# Patient Record
Sex: FEMALE | Race: WHITE | NOT HISPANIC OR LATINO | URBAN - METROPOLITAN AREA
[De-identification: names, ages, dates, MRNs, and addresses within clinical notes are randomized per-mention and may not be internally consistent; named-entity substitution may affect disease eponyms.]

---

## 2017-10-25 ENCOUNTER — HOSPITAL ENCOUNTER (INPATIENT)
Facility: MEDICAL CENTER | Age: 32
LOS: 21 days | End: 2017-11-15
Attending: OBSTETRICS & GYNECOLOGY | Admitting: OBSTETRICS & GYNECOLOGY
Payer: MEDICAID

## 2017-10-25 LAB
ABO GROUP BLD: NORMAL
ALBUMIN SERPL BCP-MCNC: 2.8 G/DL (ref 3.2–4.9)
ALBUMIN/GLOB SERPL: 0.8 G/DL
ALP SERPL-CCNC: 162 U/L (ref 30–99)
ALT SERPL-CCNC: 12 U/L (ref 2–50)
AMPHET UR QL SCN: NEGATIVE
ANION GAP SERPL CALC-SCNC: 9 MMOL/L (ref 0–11.9)
ANISOCYTOSIS BLD QL SMEAR: ABNORMAL
AST SERPL-CCNC: 33 U/L (ref 12–45)
BARBITURATES UR QL SCN: NEGATIVE
BASOPHILS # BLD AUTO: 0 % (ref 0–1.8)
BASOPHILS # BLD: 0 K/UL (ref 0–0.12)
BENZODIAZ UR QL SCN: NEGATIVE
BILIRUB SERPL-MCNC: 0.4 MG/DL (ref 0.1–1.5)
BLD GP AB SCN SERPL QL: NORMAL
BUN SERPL-MCNC: 15 MG/DL (ref 8–22)
BZE UR QL SCN: NEGATIVE
CALCIUM SERPL-MCNC: 8.7 MG/DL (ref 8.5–10.5)
CANNABINOIDS UR QL SCN: POSITIVE
CHLORIDE SERPL-SCNC: 106 MMOL/L (ref 96–112)
CO2 SERPL-SCNC: 18 MMOL/L (ref 20–33)
CREAT SERPL-MCNC: 0.7 MG/DL (ref 0.5–1.4)
CREAT UR-MCNC: 50.6 MG/DL
EOSINOPHIL # BLD AUTO: 0 K/UL (ref 0–0.51)
EOSINOPHIL NFR BLD: 0 % (ref 0–6.9)
ERYTHROCYTE [DISTWIDTH] IN BLOOD BY AUTOMATED COUNT: 47.3 FL (ref 35.9–50)
GFR SERPL CREATININE-BSD FRML MDRD: >60 ML/MIN/1.73 M 2
GLOBULIN SER CALC-MCNC: 3.6 G/DL (ref 1.9–3.5)
GLUCOSE SERPL-MCNC: 78 MG/DL (ref 65–99)
HCT VFR BLD AUTO: 29.8 % (ref 37–47)
HGB BLD-MCNC: 8.7 G/DL (ref 12–16)
HYPOCHROMIA BLD QL SMEAR: ABNORMAL
LYMPHOCYTES # BLD AUTO: 1.27 K/UL (ref 1–4.8)
LYMPHOCYTES NFR BLD: 8.8 % (ref 22–41)
MACROCYTES BLD QL SMEAR: ABNORMAL
MANUAL DIFF BLD: NORMAL
MCH RBC QN AUTO: 20.7 PG (ref 27–33)
MCHC RBC AUTO-ENTMCNC: 29.2 G/DL (ref 33.6–35)
MCV RBC AUTO: 70.8 FL (ref 81.4–97.8)
METAMYELOCYTES NFR BLD MANUAL: 1.7 %
METHADONE UR QL SCN: NEGATIVE
MICROCYTES BLD QL SMEAR: ABNORMAL
MONOCYTES # BLD AUTO: 0.13 K/UL (ref 0–0.85)
MONOCYTES NFR BLD AUTO: 0.9 % (ref 0–13.4)
MORPHOLOGY BLD-IMP: NORMAL
MYELOCYTES NFR BLD MANUAL: 0.9 %
NEUTROPHILS # BLD AUTO: 12.63 K/UL (ref 2–7.15)
NEUTROPHILS NFR BLD: 86.8 % (ref 44–72)
NEUTS BAND NFR BLD MANUAL: 0.9 % (ref 0–10)
NRBC # BLD AUTO: 0.25 K/UL
NRBC BLD AUTO-RTO: 1.7 /100 WBC
OPIATES UR QL SCN: NEGATIVE
OXYCODONE UR QL SCN: NEGATIVE
PCP UR QL SCN: NEGATIVE
PLATELET # BLD AUTO: 148 K/UL (ref 164–446)
PLATELET BLD QL SMEAR: NORMAL
PMV BLD AUTO: 10.7 FL (ref 9–12.9)
POLYCHROMASIA BLD QL SMEAR: NORMAL
POTASSIUM SERPL-SCNC: 3.9 MMOL/L (ref 3.6–5.5)
PROPOXYPH UR QL SCN: NEGATIVE
PROT SERPL-MCNC: 6.4 G/DL (ref 6–8.2)
PROT UR-MCNC: 423.3 MG/DL (ref 0–15)
PROT/CREAT UR: 8366 MG/G (ref 10–107)
RBC # BLD AUTO: 4.21 M/UL (ref 4.2–5.4)
RBC BLD AUTO: PRESENT
RH BLD: NORMAL
SODIUM SERPL-SCNC: 133 MMOL/L (ref 135–145)
URATE SERPL-MCNC: 6.3 MG/DL (ref 1.9–8.2)
WBC # BLD AUTO: 14.4 K/UL (ref 4.8–10.8)

## 2017-10-25 PROCEDURE — A9270 NON-COVERED ITEM OR SERVICE: HCPCS | Performed by: OBSTETRICS & GYNECOLOGY

## 2017-10-25 PROCEDURE — 700105 HCHG RX REV CODE 258

## 2017-10-25 PROCEDURE — 80053 COMPREHEN METABOLIC PANEL: CPT

## 2017-10-25 PROCEDURE — 82570 ASSAY OF URINE CREATININE: CPT

## 2017-10-25 PROCEDURE — 84550 ASSAY OF BLOOD/URIC ACID: CPT

## 2017-10-25 PROCEDURE — 86850 RBC ANTIBODY SCREEN: CPT

## 2017-10-25 PROCEDURE — 302790 HCHG STAT ANTEPARTUM CARE, DAILY

## 2017-10-25 PROCEDURE — 85007 BL SMEAR W/DIFF WBC COUNT: CPT

## 2017-10-25 PROCEDURE — 86901 BLOOD TYPING SEROLOGIC RH(D): CPT

## 2017-10-25 PROCEDURE — 86900 BLOOD TYPING SEROLOGIC ABO: CPT

## 2017-10-25 PROCEDURE — 36415 COLL VENOUS BLD VENIPUNCTURE: CPT

## 2017-10-25 PROCEDURE — 84156 ASSAY OF PROTEIN URINE: CPT

## 2017-10-25 PROCEDURE — 85027 COMPLETE CBC AUTOMATED: CPT

## 2017-10-25 PROCEDURE — 700102 HCHG RX REV CODE 250 W/ 637 OVERRIDE(OP): Performed by: OBSTETRICS & GYNECOLOGY

## 2017-10-25 PROCEDURE — 80307 DRUG TEST PRSMV CHEM ANLYZR: CPT

## 2017-10-25 PROCEDURE — 770002 HCHG ROOM/CARE - OB PRIVATE (112)

## 2017-10-25 RX ORDER — LABETALOL 100 MG/1
200 TABLET, FILM COATED ORAL ONCE
Status: COMPLETED | OUTPATIENT
Start: 2017-10-25 | End: 2017-10-25

## 2017-10-25 RX ORDER — BETAMETHASONE SODIUM PHOSPHATE AND BETAMETHASONE ACETATE 3; 3 MG/ML; MG/ML
12 INJECTION, SUSPENSION INTRA-ARTICULAR; INTRALESIONAL; INTRAMUSCULAR; SOFT TISSUE ONCE
Status: COMPLETED | OUTPATIENT
Start: 2017-10-26 | End: 2017-10-26

## 2017-10-25 RX ORDER — SODIUM CHLORIDE, SODIUM LACTATE, POTASSIUM CHLORIDE, CALCIUM CHLORIDE 600; 310; 30; 20 MG/100ML; MG/100ML; MG/100ML; MG/100ML
INJECTION, SOLUTION INTRAVENOUS
Status: COMPLETED
Start: 2017-10-25 | End: 2017-10-25

## 2017-10-25 RX ADMIN — SODIUM CHLORIDE, POTASSIUM CHLORIDE, SODIUM LACTATE AND CALCIUM CHLORIDE 1000 ML: 600; 310; 30; 20 INJECTION, SOLUTION INTRAVENOUS at 21:45

## 2017-10-25 RX ADMIN — LABETALOL HYDROCHLORIDE 200 MG: 100 TABLET, FILM COATED ORAL at 21:44

## 2017-10-25 ASSESSMENT — LIFESTYLE VARIABLES
DO YOU DRINK ALCOHOL: NO
EVER_SMOKED: YES
ALCOHOL_USE: NO

## 2017-10-25 ASSESSMENT — PAIN SCALES - GENERAL
PAINLEVEL_OUTOF10: 0
PAINLEVEL_OUTOF10: 0

## 2017-10-26 LAB
ABO GROUP BLD: NORMAL
ALBUMIN SERPL BCP-MCNC: 2.9 G/DL (ref 3.2–4.9)
ALBUMIN/GLOB SERPL: 0.9 G/DL
ALP SERPL-CCNC: 156 U/L (ref 30–99)
ALT SERPL-CCNC: 14 U/L (ref 2–50)
ANION GAP SERPL CALC-SCNC: 7 MMOL/L (ref 0–11.9)
ANISOCYTOSIS BLD QL SMEAR: ABNORMAL
AST SERPL-CCNC: 30 U/L (ref 12–45)
BASOPHILS # BLD AUTO: 0.4 % (ref 0–1.8)
BASOPHILS # BLD: 0.05 K/UL (ref 0–0.12)
BILIRUB SERPL-MCNC: 0.3 MG/DL (ref 0.1–1.5)
BUN SERPL-MCNC: 20 MG/DL (ref 8–22)
CALCIUM SERPL-MCNC: 8.4 MG/DL (ref 8.5–10.5)
CHLORIDE SERPL-SCNC: 106 MMOL/L (ref 96–112)
CO2 SERPL-SCNC: 19 MMOL/L (ref 20–33)
COMMENT 1642: NORMAL
CREAT SERPL-MCNC: 1.06 MG/DL (ref 0.5–1.4)
EOSINOPHIL # BLD AUTO: 0.01 K/UL (ref 0–0.51)
EOSINOPHIL NFR BLD: 0.1 % (ref 0–6.9)
ERYTHROCYTE [DISTWIDTH] IN BLOOD BY AUTOMATED COUNT: 48.2 FL (ref 35.9–50)
FERRITIN SERPL-MCNC: 18.7 NG/ML (ref 10–291)
GFR SERPL CREATININE-BSD FRML MDRD: 59 ML/MIN/1.73 M 2
GLOBULIN SER CALC-MCNC: 3.2 G/DL (ref 1.9–3.5)
GLUCOSE SERPL-MCNC: 143 MG/DL (ref 65–99)
HCT VFR BLD AUTO: 29.4 % (ref 37–47)
HGB BLD-MCNC: 8.4 G/DL (ref 12–16)
HYPOCHROMIA BLD QL SMEAR: ABNORMAL
IMM GRANULOCYTES # BLD AUTO: 0.71 K/UL (ref 0–0.11)
IMM GRANULOCYTES NFR BLD AUTO: 5.4 % (ref 0–0.9)
IRON SATN MFR SERPL: 5 % (ref 15–55)
IRON SERPL-MCNC: 24 UG/DL (ref 40–170)
LDH SERPL-CCNC: 283 U/L (ref 107–266)
LYMPHOCYTES # BLD AUTO: 1.54 K/UL (ref 1–4.8)
LYMPHOCYTES NFR BLD: 11.6 % (ref 22–41)
MACROCYTES BLD QL SMEAR: ABNORMAL
MCH RBC QN AUTO: 20.8 PG (ref 27–33)
MCHC RBC AUTO-ENTMCNC: 28.6 G/DL (ref 33.6–35)
MCV RBC AUTO: 72.8 FL (ref 81.4–97.8)
MICROCYTES BLD QL SMEAR: ABNORMAL
MONOCYTES # BLD AUTO: 0.1 K/UL (ref 0–0.85)
MONOCYTES NFR BLD AUTO: 0.8 % (ref 0–13.4)
MORPHOLOGY BLD-IMP: NORMAL
NEUTROPHILS # BLD AUTO: 10.86 K/UL (ref 2–7.15)
NEUTROPHILS NFR BLD: 81.7 % (ref 44–72)
NRBC # BLD AUTO: 0.29 K/UL
NRBC BLD AUTO-RTO: 2.2 /100 WBC
PLATELET # BLD AUTO: 159 K/UL (ref 164–446)
PLATELET BLD QL SMEAR: NORMAL
POIKILOCYTOSIS BLD QL SMEAR: NORMAL
POLYCHROMASIA BLD QL SMEAR: NORMAL
POTASSIUM SERPL-SCNC: 4.1 MMOL/L (ref 3.6–5.5)
PROT SERPL-MCNC: 6.1 G/DL (ref 6–8.2)
RBC # BLD AUTO: 4.04 M/UL (ref 4.2–5.4)
RBC BLD AUTO: PRESENT
SCHISTOCYTES BLD QL SMEAR: NORMAL
SODIUM SERPL-SCNC: 132 MMOL/L (ref 135–145)
TIBC SERPL-MCNC: 524 UG/DL (ref 250–450)
WBC # BLD AUTO: 13.3 K/UL (ref 4.8–10.8)

## 2017-10-26 PROCEDURE — 84156 ASSAY OF PROTEIN URINE: CPT

## 2017-10-26 PROCEDURE — 700111 HCHG RX REV CODE 636 W/ 250 OVERRIDE (IP): Performed by: OBSTETRICS & GYNECOLOGY

## 2017-10-26 PROCEDURE — 770002 HCHG ROOM/CARE - OB PRIVATE (112)

## 2017-10-26 PROCEDURE — 83550 IRON BINDING TEST: CPT

## 2017-10-26 PROCEDURE — 80053 COMPREHEN METABOLIC PANEL: CPT

## 2017-10-26 PROCEDURE — 82728 ASSAY OF FERRITIN: CPT

## 2017-10-26 PROCEDURE — 85025 COMPLETE CBC W/AUTO DIFF WBC: CPT

## 2017-10-26 PROCEDURE — 700102 HCHG RX REV CODE 250 W/ 637 OVERRIDE(OP): Performed by: OBSTETRICS & GYNECOLOGY

## 2017-10-26 PROCEDURE — 302790 HCHG STAT ANTEPARTUM CARE, DAILY

## 2017-10-26 PROCEDURE — 36415 COLL VENOUS BLD VENIPUNCTURE: CPT

## 2017-10-26 PROCEDURE — A9270 NON-COVERED ITEM OR SERVICE: HCPCS | Performed by: OBSTETRICS & GYNECOLOGY

## 2017-10-26 PROCEDURE — 81050 URINALYSIS VOLUME MEASURE: CPT

## 2017-10-26 PROCEDURE — 83615 LACTATE (LD) (LDH) ENZYME: CPT

## 2017-10-26 PROCEDURE — 83540 ASSAY OF IRON: CPT

## 2017-10-26 RX ORDER — DOCUSATE SODIUM 100 MG/1
100 CAPSULE, LIQUID FILLED ORAL DAILY
Status: DISCONTINUED | OUTPATIENT
Start: 2017-10-26 | End: 2017-11-15 | Stop reason: HOSPADM

## 2017-10-26 RX ORDER — FERROUS GLUCONATE 324(38)MG
324 TABLET ORAL
Status: DISCONTINUED | OUTPATIENT
Start: 2017-10-26 | End: 2017-11-10

## 2017-10-26 RX ORDER — VITAMIN A ACETATE, BETA CAROTENE, ASCORBIC ACID, CHOLECALCIFEROL, .ALPHA.-TOCOPHEROL ACETATE, DL-, THIAMINE MONONITRATE, RIBOFLAVIN, NIACINAMIDE, PYRIDOXINE HYDROCHLORIDE, FOLIC ACID, CYANOCOBALAMIN, CALCIUM CARBONATE, FERROUS FUMARATE, ZINC OXIDE, CUPRIC OXIDE 3080; 12; 120; 400; 1; 1.84; 3; 20; 22; 920; 25; 200; 27; 10; 2 [IU]/1; UG/1; MG/1; [IU]/1; MG/1; MG/1; MG/1; MG/1; MG/1; [IU]/1; MG/1; MG/1; MG/1; MG/1; MG/1
1 TABLET, FILM COATED ORAL EVERY MORNING
Status: DISCONTINUED | OUTPATIENT
Start: 2017-10-26 | End: 2017-11-10

## 2017-10-26 RX ADMIN — Medication 1 TABLET: at 08:56

## 2017-10-26 RX ADMIN — FERROUS GLUCONATE 324 MG: 324 TABLET ORAL at 08:56

## 2017-10-26 RX ADMIN — BETAMETHASONE SODIUM PHOSPHATE AND BETAMETHASONE ACETATE 12 MG: 3; 3 INJECTION, SUSPENSION INTRA-ARTICULAR; INTRALESIONAL; INTRAMUSCULAR at 17:19

## 2017-10-26 ASSESSMENT — PAIN SCALES - GENERAL
PAINLEVEL_OUTOF10: 0

## 2017-10-26 ASSESSMENT — PATIENT HEALTH QUESTIONNAIRE - PHQ9
2. FEELING DOWN, DEPRESSED, IRRITABLE, OR HOPELESS: NOT AT ALL
SUM OF ALL RESPONSES TO PHQ9 QUESTIONS 1 AND 2: 0
1. LITTLE INTEREST OR PLEASURE IN DOING THINGS: NOT AT ALL
SUM OF ALL RESPONSES TO PHQ QUESTIONS 1-9: 0

## 2017-10-26 ASSESSMENT — COPD QUESTIONNAIRES
DURING THE PAST 4 WEEKS HOW MUCH DID YOU FEEL SHORT OF BREATH: NONE/LITTLE OF THE TIME
HAVE YOU SMOKED AT LEAST 100 CIGARETTES IN YOUR ENTIRE LIFE: YES
DO YOU EVER COUGH UP ANY MUCUS OR PHLEGM?: NO/ONLY WITH OCCASIONAL COLDS OR INFECTIONS

## 2017-10-26 ASSESSMENT — LIFESTYLE VARIABLES: DO YOU DRINK ALCOHOL: NO

## 2017-10-26 NOTE — PROGRESS NOTES
0655 Received bedside report from Emmy DONALDSON, poc/orders reviewed, care assumed. 1510 Report give to Samantha DONALDSON.

## 2017-10-26 NOTE — PROGRESS NOTES
1942- 33 y/o, , EDC 17, EGA 31.5. Here to room 230 via care flight from [facility]. Patient is here for increased BPs and preeclampsia workup. EFM/toco applied, patient denies lof/bleeding, reports positive fm. Denies UCs or pain. Elevated BPs noted. Dr. Kathleen called with updates. Orders received for 24 hour urine and labs. Dr. Kathleen to come in to see patient. No orders for BP medications.     - 24 hour urine started. Patient educated.     - Orders for labetalol PO from Dr. Kathleen.    0000- BPS stable. No complaints from patient. Dr. Kathleen discussed POC thoroughly with patient and answered all questions.     0700- Report to BAL Carrington RN. POC discussed.

## 2017-10-26 NOTE — CARE PLAN
Problem: Risk for injury  Goal: Patient and fetus will be free of preventable injury/complications    Intervention: Monitor Fetal well being  EFM placed for fetal well being. Patient educated.      Problem: Cardiac Output  Goal: Patient will remain normotensive throughout hospitalization    Intervention: Assess and document BP, pulse and oxygen saturation  Assesses BP as indicated and treatment as indicated. Patient educated.

## 2017-10-26 NOTE — CARE PLAN
Problem: Pain  Goal: Alleviation of Pain or a reduction in pain to the patient's comfort goal    Intervention: Initial pain assessment  Patient denies any complains of pain, patient educated on pain scale, will notify RN with any changes to pain status.

## 2017-10-26 NOTE — H&P
DATE OF ADMISSION:  10/25/2017.    REASON FOR TRANSFER:  Preeclampsia.    REQUESTING PHYSICIAN:  Dr. Vijay White.    MODE OF TRANSPORTATION:  Air.    HISTORY OF PRESENT ILLNESS:  A 32-year-old  AB2, working EDC 2017   based on early ultrasound, currently at 32 weeks.  Patient's initial prenatal   care was in [place], however patient has had limited prenatal visits for the   last visit being in [place] and she has moved to [place] in July.  She has not   seen an OB care until relatively recent believed to be about 3 weeks ago.    Patient was noted approximately 2-3 weeks ago to have elevated blood pressure,   has been followed by Dr. White and his colleagues and today on a routine   visit was found to have elevated blood pressures, evidence of proteinuria,   reported creatinine of 1.1.  Transfer was requested due to early gestational   age.  First dose of betamethasone was administered.    PAST MEDICAL HISTORY:  She denies any major medical problems, asthma,   seizures, hypertension, cardiovascular, GI or  diseases.    OPERATIONS:  Left thumb partial amputation.    TRANSFUSIONS:  None.    ALLERGIES:  None.    MEDICATIONS:  Iron.    VENEREAL DISEASE HISTORY:  Significant for HPV.    PAST OBSTETRICAL HISTORY:  In , spontaneous AB,  spontaneous AB, ,   term male, 6 pounds, .  No other complications.    SOCIAL HISTORY:  Tobacco use, quarter pack to one half pack of cigarettes per   day.  Denies alcohol or recreational drug use, but does admit to marijuana use   1 time a week.    PHYSICAL EXAMINATION:  GENERAL:  Well developed, well nourished female.    VITAL SIGNS:  Upon arrival, her initial blood pressure was elevated at 162/97,   repeat 144/79 and subsequent 160/104, 163/89.  Patient received labetalol 200   mg and most recent blood pressure was 134/101.  She is afebrile.  HEAD:  Normocephalic.  EYES:  No scleral icterus or subconjunctival pallor.  Pupils are equal,   reactive to  light and accommodation.  Extraocular movements symmetrical.  EAR, NOSE AND THROAT:  Grossly within normal limits.  LUNGS:  Clear.  HEART:  Regular rate and rhythm.  Normal S1 and S2.  No S3, S4 or murmurs.  ABDOMEN:  Soft, gravid uterus, small for dates.  EXTREMITIES:  No edema or varicosities.  Cranial nerves II-XII grossly intact.    Deep tendon reflexes 2-3+.  No clonus.    LABORATORY DATA:  Reveals the patient's hematocrit 29.8, hemoglobin 8.7,   platelet count 148,000.  The sodium 133, potassium 3.9, BUN 15, creatinine   0.7, AST and ALT are normal.  Uric acid elevated at 6.3.  Urine toxicology   screen positive for cannabinoids, protein creatinine ratio 83:66, blood type O   positive.  Antibody screen - negative.    ASSESSMENT:  1.  Intrauterine pregnancy at 32 weeks.  2.  Preeclampsia.  3.  Probable IGI.  4.  Marijuana use.  5.  Tobacco use.  6.  Limited prenatal care.    PLAN:  Patient administered first dose of betamethasone prior to transport.    The patient has been advised that the primary goal is to achieve 48 hours of   corticosteroids and any additional time again will be after the baby delivery   if she remains stable, till 37 weeks.    We discussed the issue of preeclampsia and that is progressive and that the cure is delivery; however, it is always a balance with the maternal   indications and fetal indications.  At present time, the baby is reactive, so   the most likely scenario is maternal indicated delivery.   We discussed that she is in the   hospital for the duration of pregnancy.  At the present time, she is not   requesting a nicotine patch, however we will observe for symptoms.  All   questions answered to her satisfaction.  Consultation with neonatology to be   achieved in the morning and ultrasound to be deferred at the present time and   would be performed in the next day or 2.    All questions were answered to her satisfaction.    Time: 90 minutes        ____________________________________     MD LAZ KUMAR    DD:  10/26/2017 01:26:29  DT:  10/26/2017 02:54:50    D#:  [id number]  Job#:  [id number]

## 2017-10-26 NOTE — CARE PLAN
Problem: Risk for Infection, Impaired Wound Healing  Goal: Remain free from signs and symptoms of infection    Intervention: Infection prevention measures  Patient educated on the importance of good hand hygiene in order to prevent infection, patient verbalized understanding.

## 2017-10-27 PROBLEM — O99.019 ANEMIA AFFECTING PREGNANCY: Status: ACTIVE | Noted: 2017-10-27

## 2017-10-27 PROBLEM — O14.90 PREECLAMPSIA: Status: ACTIVE | Noted: 2017-10-27

## 2017-10-27 PROBLEM — F12.90 MARIJUANA USE: Status: ACTIVE | Noted: 2017-10-27

## 2017-10-27 PROBLEM — O09.30 LIMITED PRENATAL CARE: Status: ACTIVE | Noted: 2017-10-27

## 2017-10-27 LAB
ALBUMIN SERPL BCP-MCNC: 2.8 G/DL (ref 3.2–4.9)
ALBUMIN/GLOB SERPL: 0.9 G/DL
ALP SERPL-CCNC: 147 U/L (ref 30–99)
ALT SERPL-CCNC: 15 U/L (ref 2–50)
ANION GAP SERPL CALC-SCNC: 9 MMOL/L (ref 0–11.9)
AST SERPL-CCNC: 31 U/L (ref 12–45)
BILIRUB SERPL-MCNC: 0.3 MG/DL (ref 0.1–1.5)
BUN SERPL-MCNC: 23 MG/DL (ref 8–22)
CALCIUM SERPL-MCNC: 8.7 MG/DL (ref 8.5–10.5)
CHLORIDE SERPL-SCNC: 105 MMOL/L (ref 96–112)
CO2 SERPL-SCNC: 17 MMOL/L (ref 20–33)
CREAT SERPL-MCNC: 0.92 MG/DL (ref 0.5–1.4)
ERYTHROCYTE [DISTWIDTH] IN BLOOD BY AUTOMATED COUNT: 47.4 FL (ref 35.9–50)
GFR SERPL CREATININE-BSD FRML MDRD: >60 ML/MIN/1.73 M 2
GLOBULIN SER CALC-MCNC: 3 G/DL (ref 1.9–3.5)
GLUCOSE SERPL-MCNC: 130 MG/DL (ref 65–99)
HCT VFR BLD AUTO: 27 % (ref 37–47)
HGB BLD-MCNC: 7.8 G/DL (ref 12–16)
MCH RBC QN AUTO: 21.3 PG (ref 27–33)
MCHC RBC AUTO-ENTMCNC: 28.9 G/DL (ref 33.6–35)
MCV RBC AUTO: 73.8 FL (ref 81.4–97.8)
PLATELET # BLD AUTO: 145 K/UL (ref 164–446)
POTASSIUM SERPL-SCNC: 4.2 MMOL/L (ref 3.6–5.5)
PROT 24H UR-MCNC: 6247.8 MG/24 HR (ref 30–150)
PROT 24H UR-MRATE: 231.4 MG/DL (ref 0–15)
PROT SERPL-MCNC: 5.8 G/DL (ref 6–8.2)
RBC # BLD AUTO: 3.66 M/UL (ref 4.2–5.4)
SODIUM SERPL-SCNC: 131 MMOL/L (ref 135–145)
SPECIMEN VOL UR: 2700 ML
TREPONEMA PALLIDUM IGG+IGM AB [PRESENCE] IN SERUM OR PLASMA BY IMMUNOASSAY: NON REACTIVE
WBC # BLD AUTO: 13.9 K/UL (ref 4.8–10.8)

## 2017-10-27 PROCEDURE — 80053 COMPREHEN METABOLIC PANEL: CPT

## 2017-10-27 PROCEDURE — 770002 HCHG ROOM/CARE - OB PRIVATE (112)

## 2017-10-27 PROCEDURE — 700102 HCHG RX REV CODE 250 W/ 637 OVERRIDE(OP): Performed by: OBSTETRICS & GYNECOLOGY

## 2017-10-27 PROCEDURE — 59025 FETAL NON-STRESS TEST: CPT | Performed by: OBSTETRICS & GYNECOLOGY

## 2017-10-27 PROCEDURE — A9270 NON-COVERED ITEM OR SERVICE: HCPCS | Performed by: OBSTETRICS & GYNECOLOGY

## 2017-10-27 PROCEDURE — 86780 TREPONEMA PALLIDUM: CPT

## 2017-10-27 PROCEDURE — 700112 HCHG RX REV CODE 229: Performed by: OBSTETRICS & GYNECOLOGY

## 2017-10-27 PROCEDURE — 302790 HCHG STAT ANTEPARTUM CARE, DAILY

## 2017-10-27 PROCEDURE — 85027 COMPLETE CBC AUTOMATED: CPT

## 2017-10-27 PROCEDURE — 36415 COLL VENOUS BLD VENIPUNCTURE: CPT

## 2017-10-27 RX ORDER — LABETALOL 100 MG/1
100 TABLET, FILM COATED ORAL EVERY 12 HOURS
Status: DISCONTINUED | OUTPATIENT
Start: 2017-10-27 | End: 2017-10-29

## 2017-10-27 RX ADMIN — FERROUS GLUCONATE 324 MG: 324 TABLET ORAL at 10:05

## 2017-10-27 RX ADMIN — LABETALOL HYDROCHLORIDE 100 MG: 100 TABLET, FILM COATED ORAL at 21:11

## 2017-10-27 RX ADMIN — DOCUSATE SODIUM 100 MG: 100 CAPSULE ORAL at 10:05

## 2017-10-27 RX ADMIN — LABETALOL HYDROCHLORIDE 100 MG: 100 TABLET, FILM COATED ORAL at 12:56

## 2017-10-27 RX ADMIN — Medication 1 TABLET: at 10:05

## 2017-10-27 ASSESSMENT — PATIENT HEALTH QUESTIONNAIRE - PHQ9
SUM OF ALL RESPONSES TO PHQ9 QUESTIONS 1 AND 2: 0
SUM OF ALL RESPONSES TO PHQ QUESTIONS 1-9: 0
1. LITTLE INTEREST OR PLEASURE IN DOING THINGS: NOT AT ALL
2. FEELING DOWN, DEPRESSED, IRRITABLE, OR HOPELESS: NOT AT ALL

## 2017-10-27 ASSESSMENT — PAIN SCALES - GENERAL
PAINLEVEL_OUTOF10: 0

## 2017-10-27 ASSESSMENT — LIFESTYLE VARIABLES: DO YOU DRINK ALCOHOL: NO

## 2017-10-27 NOTE — CARE PLAN
Problem: Risk for injury  Goal: Patient and fetus will be free of preventable injury/complications    Intervention: Monitor uterine activity  [place] in place for maternal and fetal well being.      Problem: Cardiac Output  Goal: Patient will remain normotensive throughout hospitalization    Intervention: Assess and document BP, pulse and oxygen saturation  Assesses BPs regularly and provides treatment as needed.

## 2017-10-27 NOTE — PROGRESS NOTES
1900- Received report from SIMÓN Frank RN. Patient denies any UCs, lof, or VB. Reports + FM. No complaints at this time.     2055- 24 hour urine sent down to lab.     0700- Report to day shift RN. POC discussed.

## 2017-10-27 NOTE — PROGRESS NOTES
S: No complaints.  Feels well.  O: Afebrile  BP: 127-158/69-82 mmHg       No change to physical status        Plt: 145K        BUN:23, Cr. 0.92        24 hour urine: 6247 mg/24 hours        EFM reactive        Ultrasound: vertex, IUGR, PEG: 10.69, UA doppler 3.63, EFW: 918 gm.  A:  IUP 32 2/7 weeks       Preeclampsia, rule out renal disease       IUGR       Iron deficiency  P:  Completed steroids, monitor labs for now.        Start labetalol 100 mg BID        May need iron IV infusion         consult.              Time:  40 minutes.

## 2017-10-27 NOTE — PROGRESS NOTES
S: No complaints.  Feels well.  O: Afebrile  BP: 130/68, 142/98       No change to physical status        Plt: 159K        BUN:20, Cr. 1.06        24 hour urine in progress.        EFM reactive  A:  IUP 32 1/7 weeks       Preeclampsia, rule out renal disease  P:  Complete steroids, monitor labs for now.        Start antihypretensive therapy if BP becomes elevated.    Time:  15 minutes.

## 2017-10-27 NOTE — PROGRESS NOTES
[facility] -  EGA - 32.1    1530 -Report received from HARSHA Zamudio RN Assumed care of patient at this time. External monitors in place x2.  0 Report given to YASMINE Allen RN.

## 2017-10-27 NOTE — CONSULTS
Neonatology Note:  I was asked by Dr. Kathleen to speak with Ms. Walls about issues of prematurity. She is 32 weeks gestation with very IUGR female infant and likely has PIH that is controlled on labetalol. We discussed function of betamethsone and Curosurf for RDS, TPN/feeding slowly due to IUGR, NEC, and general NICU concerns. Questions were answered. Total time 30 minutes.  Bella Field MD

## 2017-10-27 NOTE — CARE PLAN
Problem: Safety  Goal: Free from accidental injury  Outcome: PROGRESSING AS EXPECTED  Spoke w/ pt about using the call night if she needs assistance, bed in low position, upper side rail in up position.     Problem: Risk for Infection, Impaired Wound Healing  Goal: Remain free from signs and symptoms of infection  Outcome: PROGRESSING AS EXPECTED  Pt remains free from s/s of infection.

## 2017-10-28 LAB
ALBUMIN SERPL BCP-MCNC: 2.7 G/DL (ref 3.2–4.9)
ALBUMIN/GLOB SERPL: 0.9 G/DL
ALP SERPL-CCNC: 148 U/L (ref 30–99)
ALT SERPL-CCNC: 17 U/L (ref 2–50)
ANION GAP SERPL CALC-SCNC: 10 MMOL/L (ref 0–11.9)
AST SERPL-CCNC: 36 U/L (ref 12–45)
BILIRUB SERPL-MCNC: 0.3 MG/DL (ref 0.1–1.5)
BUN SERPL-MCNC: 25 MG/DL (ref 8–22)
CALCIUM SERPL-MCNC: 8.6 MG/DL (ref 8.5–10.5)
CHLORIDE SERPL-SCNC: 106 MMOL/L (ref 96–112)
CO2 SERPL-SCNC: 18 MMOL/L (ref 20–33)
CREAT SERPL-MCNC: 0.97 MG/DL (ref 0.5–1.4)
ERYTHROCYTE [DISTWIDTH] IN BLOOD BY AUTOMATED COUNT: 49.1 FL (ref 35.9–50)
GFR SERPL CREATININE-BSD FRML MDRD: >60 ML/MIN/1.73 M 2
GLOBULIN SER CALC-MCNC: 3.1 G/DL (ref 1.9–3.5)
GLUCOSE SERPL-MCNC: 110 MG/DL (ref 65–99)
HCT VFR BLD AUTO: 28.2 % (ref 37–47)
HGB BLD-MCNC: 8.1 G/DL (ref 12–16)
MCH RBC QN AUTO: 21.4 PG (ref 27–33)
MCHC RBC AUTO-ENTMCNC: 28.7 G/DL (ref 33.6–35)
MCV RBC AUTO: 74.4 FL (ref 81.4–97.8)
PLATELET # BLD AUTO: 164 K/UL (ref 164–446)
POTASSIUM SERPL-SCNC: 4.1 MMOL/L (ref 3.6–5.5)
PROT SERPL-MCNC: 5.8 G/DL (ref 6–8.2)
RBC # BLD AUTO: 3.79 M/UL (ref 4.2–5.4)
SODIUM SERPL-SCNC: 134 MMOL/L (ref 135–145)
WBC # BLD AUTO: 12.3 K/UL (ref 4.8–10.8)

## 2017-10-28 PROCEDURE — 85027 COMPLETE CBC AUTOMATED: CPT

## 2017-10-28 PROCEDURE — 700112 HCHG RX REV CODE 229: Performed by: OBSTETRICS & GYNECOLOGY

## 2017-10-28 PROCEDURE — 302790 HCHG STAT ANTEPARTUM CARE, DAILY

## 2017-10-28 PROCEDURE — 770002 HCHG ROOM/CARE - OB PRIVATE (112)

## 2017-10-28 PROCEDURE — 700102 HCHG RX REV CODE 250 W/ 637 OVERRIDE(OP): Performed by: OBSTETRICS & GYNECOLOGY

## 2017-10-28 PROCEDURE — 59025 FETAL NON-STRESS TEST: CPT | Performed by: OBSTETRICS & GYNECOLOGY

## 2017-10-28 PROCEDURE — 36415 COLL VENOUS BLD VENIPUNCTURE: CPT

## 2017-10-28 PROCEDURE — A9270 NON-COVERED ITEM OR SERVICE: HCPCS | Performed by: OBSTETRICS & GYNECOLOGY

## 2017-10-28 PROCEDURE — 80053 COMPREHEN METABOLIC PANEL: CPT

## 2017-10-28 RX ADMIN — DOCUSATE SODIUM 100 MG: 100 CAPSULE ORAL at 09:07

## 2017-10-28 RX ADMIN — FERROUS GLUCONATE 324 MG: 324 TABLET ORAL at 09:07

## 2017-10-28 RX ADMIN — Medication 1 TABLET: at 09:07

## 2017-10-28 RX ADMIN — LABETALOL HYDROCHLORIDE 100 MG: 100 TABLET, FILM COATED ORAL at 20:08

## 2017-10-28 RX ADMIN — LABETALOL HYDROCHLORIDE 100 MG: 100 TABLET, FILM COATED ORAL at 09:07

## 2017-10-28 ASSESSMENT — PATIENT HEALTH QUESTIONNAIRE - PHQ9
2. FEELING DOWN, DEPRESSED, IRRITABLE, OR HOPELESS: NOT AT ALL
1. LITTLE INTEREST OR PLEASURE IN DOING THINGS: NOT AT ALL
SUM OF ALL RESPONSES TO PHQ9 QUESTIONS 1 AND 2: 0
SUM OF ALL RESPONSES TO PHQ9 QUESTIONS 1 AND 2: 0
SUM OF ALL RESPONSES TO PHQ QUESTIONS 1-9: 0
SUM OF ALL RESPONSES TO PHQ QUESTIONS 1-9: 0
2. FEELING DOWN, DEPRESSED, IRRITABLE, OR HOPELESS: NOT AT ALL
1. LITTLE INTEREST OR PLEASURE IN DOING THINGS: NOT AT ALL

## 2017-10-28 ASSESSMENT — PAIN SCALES - GENERAL
PAINLEVEL_OUTOF10: 0

## 2017-10-28 NOTE — PROGRESS NOTES
32 y.o.  EDC 17 EGA 32.2  from [place] admitted for preeclampsia, suspected kidney disease, & IUGR, Anticipated management will include hospitalization until delivery. Pt states she is hoping to adopt this baby out to her sister who is unable to have children of her own.  consult requested.   Steroids complete on 10/26 at 1719.  Dr. Kathleen at bedside. US performed  g consistent with EGA of 27 weeks.   1200 elevated /89. Report to Dr. Kathleen, po labetalol ordered.   1300 100 Labetalol given po per MD order.   Per Dr. Kathleen, NST q shift, [place] lock, bathroom and shower privileges.   Pt sister and  here with pt, appropriately providing good support to pt.   1600 pt up to shower, linens changed.    Report to Gisselle SANTANA RN.

## 2017-10-28 NOTE — PROGRESS NOTES
Report received and plan of care discussed.  GA = 32.3 wks.  Patient awake, denies headache, blurred vision, dizziness, or epigastric pain.  Also denies feeling UC's, vaginal bleeding or leaking.  Patient states she is feeling the baby moving lots.  No other problems voiced at this time.  2200--Patient visiting with her family.  Doing well.    0000--Patient states she is doing well.  No change in patient status.  0400--Patient has been resting comfortaboy tonight.  Denies headache, blurred vision, dizziness, or epigastric pain.  Sates she has been feeling the baby moving normally, denies feeling UC's.  Denies vaginal bleeding or leaking.   0600--Patient remains essentially the same.  States she is feeling baby moving, denies headache, blurred vision, dizziness, or epigastric pain.  0700--Report to oncoming RN and plan of care discussed.

## 2017-10-28 NOTE — CARE PLAN
Problem: Risk for Infection, Impaired Wound Healing  Goal: Remain free from signs and symptoms of infection  Outcome: PROGRESSING AS EXPECTED  Patient is afebrile. No S&S of infections     Problem: Pain  Goal: Alleviation of Pain or a reduction in pain to the patient's comfort goal  Outcome: PROGRESSING AS EXPECTED  Patient denies any pain or contractions. Instructed to notify RN if any pain develops

## 2017-10-28 NOTE — PROGRESS NOTES
S: No complaints.  Feels well.  O: Afebrile  BP: 143/83 mmHg       No change to physical status        Plt: 164K        BUN:25, Cr. 0.97        24 hour urine: 6247 mg/24 hours        EFM reactive          A:  IUP 32 3/7 weeks       Preeclampsia, rule out renal disease       IUGR       Iron deficiency  P:  Monitor labs for now.        Labetalol 100 mg BID    Time: 15minutes

## 2017-10-28 NOTE — CARE PLAN
Problem: Cardiac Output  Goal: Patient will remain normotensive throughout hospitalization  Outcome: PROGRESSING AS EXPECTED  Patient's BP in parameters tonight.  Taking labetalol now.

## 2017-10-28 NOTE — CARE PLAN
Problem: Risk for Infection, Impaired Wound Healing  Goal: Remain free from signs and symptoms of infection  Outcome: PROGRESSING AS EXPECTED  Patient     Problem: Pain  Goal: Alleviation of Pain or a reduction in pain to the patient's comfort goal  Outcome: PROGRESSING AS EXPECTED  Patient denies any pain or contractions. Patient instructed to notify RN if any pain develops. Patient states understanding.

## 2017-10-28 NOTE — PROGRESS NOTES
0700- Report received from WILNER Mancuso RN. . Plan of care assumed. Patient is a , edc  making her 32.3 weeks.    0750- Dr Kathleen called regarding morning labs. Order received to start 24 hour urine for creatine.     0800- Assessment done. Patient denies any contractions, leaking of any fluid or any vaginal bleeding. States positive fetal movement. Denies any HA, vision changes or epigastric pain. EFM and TOCO applied.     0945- 24 hour urine started.    1600- Dr Kathleen into see patient. Discussed plan of care. Patient /82. Dr Kathleen updated. Will reassess in 10 minutes.     161- /83. Dr Kathleen updated. Order received to go back to BP's every 4 hours. Patient may go on a wheelchair ride for 10 -15 minutes.     1725- patient on a wheelchair ride with family.

## 2017-10-28 NOTE — CARE PLAN
Problem: Pain  Goal: Alleviation of Pain or a reduction in pain to the patient's comfort goal  Outcome: PROGRESSING AS EXPECTED  Patient denies pain or other problems @ this time.

## 2017-10-29 PROBLEM — O36.5930 POOR FETAL GROWTH AFFECTING MANAGEMENT OF MOTHER IN THIRD TRIMESTER: Status: ACTIVE | Noted: 2017-10-29

## 2017-10-29 LAB
ALBUMIN SERPL BCP-MCNC: 2.6 G/DL (ref 3.2–4.9)
ALBUMIN/GLOB SERPL: 0.9 G/DL
ALP SERPL-CCNC: 144 U/L (ref 30–99)
ALT SERPL-CCNC: 20 U/L (ref 2–50)
ANION GAP SERPL CALC-SCNC: 4 MMOL/L (ref 0–11.9)
AST SERPL-CCNC: 40 U/L (ref 12–45)
BILIRUB SERPL-MCNC: 0.3 MG/DL (ref 0.1–1.5)
BUN SERPL-MCNC: 22 MG/DL (ref 8–22)
CALCIUM SERPL-MCNC: 8.5 MG/DL (ref 8.5–10.5)
CHLORIDE SERPL-SCNC: 105 MMOL/L (ref 96–112)
CO2 SERPL-SCNC: 21 MMOL/L (ref 20–33)
COLLECT DURATION TIME UR: 24 HR
CREAT 24H UR-MSRATE: 1573 MG/24 HR (ref 800–1800)
CREAT CL/1.73 SQ M ?TM UR+SERPL-ARVRAT: 108 ML/MIN (ref 88–128)
CREAT SERPL-MCNC: 0.96 MG/DL (ref 0.5–1.4)
CREAT SERPL-MCNC: 0.98 MG/DL (ref 0.5–1.4)
CREAT UR-MCNC: 42.5 MG/DL
CREAT UR-MCNC: 42.5 MG/DL
ERYTHROCYTE [DISTWIDTH] IN BLOOD BY AUTOMATED COUNT: 49.8 FL (ref 35.9–50)
GFR SERPL CREATININE-BSD FRML MDRD: >60 ML/MIN/1.73 M 2
GLOBULIN SER CALC-MCNC: 3 G/DL (ref 1.9–3.5)
GLUCOSE SERPL-MCNC: 75 MG/DL (ref 65–99)
HCT VFR BLD AUTO: 28.6 % (ref 37–47)
HGB BLD-MCNC: 8.4 G/DL (ref 12–16)
MCH RBC QN AUTO: 21.4 PG (ref 27–33)
MCHC RBC AUTO-ENTMCNC: 29.4 G/DL (ref 33.6–35)
MCV RBC AUTO: 72.8 FL (ref 81.4–97.8)
PLATELET # BLD AUTO: 183 K/UL (ref 164–446)
PMV BLD AUTO: 11.4 FL (ref 9–12.9)
POTASSIUM SERPL-SCNC: 4.1 MMOL/L (ref 3.6–5.5)
PROT SERPL-MCNC: 5.6 G/DL (ref 6–8.2)
RBC # BLD AUTO: 3.93 M/UL (ref 4.2–5.4)
SODIUM SERPL-SCNC: 130 MMOL/L (ref 135–145)
SPECIMEN VOL UR: 3700 ML
SPECIMEN VOL UR: 3700 ML
URINE CREATININE EXCRETED 1125: 1573 MG/24 HR
WBC # BLD AUTO: 11.8 K/UL (ref 4.8–10.8)

## 2017-10-29 PROCEDURE — A9270 NON-COVERED ITEM OR SERVICE: HCPCS | Performed by: OBSTETRICS & GYNECOLOGY

## 2017-10-29 PROCEDURE — 81050 URINALYSIS VOLUME MEASURE: CPT

## 2017-10-29 PROCEDURE — 59025 FETAL NON-STRESS TEST: CPT | Performed by: OBSTETRICS & GYNECOLOGY

## 2017-10-29 PROCEDURE — 82575 CREATININE CLEARANCE TEST: CPT

## 2017-10-29 PROCEDURE — 700112 HCHG RX REV CODE 229: Performed by: OBSTETRICS & GYNECOLOGY

## 2017-10-29 PROCEDURE — 302790 HCHG STAT ANTEPARTUM CARE, DAILY

## 2017-10-29 PROCEDURE — 770002 HCHG ROOM/CARE - OB PRIVATE (112)

## 2017-10-29 PROCEDURE — 82570 ASSAY OF URINE CREATININE: CPT

## 2017-10-29 PROCEDURE — 85027 COMPLETE CBC AUTOMATED: CPT

## 2017-10-29 PROCEDURE — 80053 COMPREHEN METABOLIC PANEL: CPT

## 2017-10-29 PROCEDURE — 36415 COLL VENOUS BLD VENIPUNCTURE: CPT

## 2017-10-29 PROCEDURE — 700102 HCHG RX REV CODE 250 W/ 637 OVERRIDE(OP): Performed by: OBSTETRICS & GYNECOLOGY

## 2017-10-29 RX ORDER — LABETALOL 100 MG/1
100 TABLET, FILM COATED ORAL EVERY 8 HOURS
Status: DISCONTINUED | OUTPATIENT
Start: 2017-10-29 | End: 2017-10-30

## 2017-10-29 RX ADMIN — LABETALOL HYDROCHLORIDE 100 MG: 100 TABLET, FILM COATED ORAL at 08:48

## 2017-10-29 RX ADMIN — Medication 1 TABLET: at 08:48

## 2017-10-29 RX ADMIN — DOCUSATE SODIUM 100 MG: 100 CAPSULE ORAL at 08:48

## 2017-10-29 RX ADMIN — FERROUS GLUCONATE 324 MG: 324 TABLET ORAL at 07:38

## 2017-10-29 RX ADMIN — LABETALOL HYDROCHLORIDE 100 MG: 100 TABLET, FILM COATED ORAL at 16:56

## 2017-10-29 ASSESSMENT — PATIENT HEALTH QUESTIONNAIRE - PHQ9
2. FEELING DOWN, DEPRESSED, IRRITABLE, OR HOPELESS: NOT AT ALL
1. LITTLE INTEREST OR PLEASURE IN DOING THINGS: NOT AT ALL
SUM OF ALL RESPONSES TO PHQ9 QUESTIONS 1 AND 2: 0
2. FEELING DOWN, DEPRESSED, IRRITABLE, OR HOPELESS: NOT AT ALL
1. LITTLE INTEREST OR PLEASURE IN DOING THINGS: NOT AT ALL
SUM OF ALL RESPONSES TO PHQ QUESTIONS 1-9: 0
SUM OF ALL RESPONSES TO PHQ QUESTIONS 1-9: 0
SUM OF ALL RESPONSES TO PHQ9 QUESTIONS 1 AND 2: 0

## 2017-10-29 ASSESSMENT — COPD QUESTIONNAIRES
DURING THE PAST 4 WEEKS HOW MUCH DID YOU FEEL SHORT OF BREATH: NONE/LITTLE OF THE TIME
HAVE YOU SMOKED AT LEAST 100 CIGARETTES IN YOUR ENTIRE LIFE: NO/DON'T KNOW
DO YOU EVER COUGH UP ANY MUCUS OR PHLEGM?: NO/ONLY WITH OCCASIONAL COLDS OR INFECTIONS
COPD SCREENING SCORE: 0

## 2017-10-29 ASSESSMENT — LIFESTYLE VARIABLES: DO YOU DRINK ALCOHOL: NO

## 2017-10-29 ASSESSMENT — PAIN SCALES - GENERAL
PAINLEVEL_OUTOF10: 0
PAINLEVEL_OUTOF10: 2

## 2017-10-29 NOTE — CARE PLAN
Problem: Infection  Goal: Will remain free from infection    Intervention: Assess signs and symptoms of infection  No s/s of infections. Pt remains afebrile.      Problem: Psychosocial needs  Goal: Spiritual needs incorporated in hospitalization    Intervention: Encourage verbalization of feelings/concerns/expectations  Pt feelings slightly anxious tonight. Encouraged pt to voice feelings and frustrations.

## 2017-10-29 NOTE — CARE PLAN
Problem: Communication  Goal: The ability to communicate needs accurately and effectively will improve  Outcome: PROGRESSING AS EXPECTED  Pt encourage to talk about her needs. She will let the RN know her concerns or needs.

## 2017-10-29 NOTE — CARE PLAN
Problem: Knowledge Deficit  Goal: Knowledge of disease process/condition, treatment plan, diagnostic tests, and medications will improve  Outcome: PROGRESSING AS EXPECTED  Pt  Is up to date on her POC. The RN will let the pt know if there is a change and she will let the RN know if she has any questions.

## 2017-10-29 NOTE — PROGRESS NOTES
1900-rcvd report from dayshift RN and assumed care of pt.  1955-TOCO/FM applied and monitored for an hour.   2100-pt c/o what seems be anxiety per pt. Pt stated she did have a stressful conversation with someone that upset her. RN listened to pt and encouraged her to take some deep breathes and try to relax.  0600-pt had restful/uneventful night. Did have another bit of anxiety around 0400 per pt. Doing better now.

## 2017-10-29 NOTE — PROGRESS NOTES
0700. Report from Cindy DONALDSON. POC discussed and resumed.    0730. In room, pt has c/o of slight headache but does not need any medication for it. She has no needs at this time.    1006. 24 hour urine sent on dumb . Carlos in lab notified.    1250. In room, pt has no needs at this time.     1500. Pt down to the healing garden via wheelchair.    1700. In room, pt has no needs at this time.    1900. Report to Gisselle DIMAS RN. POC discussed.

## 2017-10-29 NOTE — PROGRESS NOTES
S: No complaints.  Feels well.    O: Afebrile  BP: 140/79mmHg       No change to physical status        Plt: 183K        BUN:22, Cr. 0.96        24 hour urine: 6247 mg/24 hours        Cr clearance 108 ml/min (low normal for pregnancy)        EFM reactive     A:  IUP 32 4/7 weeks       Preeclampsia, rule out renal disease       IUGR       Iron deficiency  P:  Monitor labs for now.        Labetalol 100 mg TID        Discussed onging care plan with patient and her family.  May go to [employer] today.     Time: 15minutes

## 2017-10-29 NOTE — DISCHARGE PLANNING
Medical Social Work     SW met with pt at bedside to complete assessment. Pt planning to adopt baby out to her sister and brother-in-law who are unable to have children of their own. SW provided pt with list of adoption agencies. Pt reports that sister is currently working with  to begin adoption process.      Plan:  SW to follow and assist as needed.     Discharge Planning Assessment Post Partum     Reason for Referral: Pt planning to adopt baby out to her sister and brother-in-law.  Address: [address] [place] [id number]  Type of Living Situation: Lives with other child  Mom Diagnosis: Pre-eclampsia  Baby Diagnosis: N/A  Primary Language: English     Name of Baby: Alesha Molina  Father of the Baby: Zack, unknown last name.  Involved in baby’s care? No  Contact Information: Unknown     Prenatal Care: Yes  Mom's PCP: No PCP  PCP for new baby: No PCP yet.      Support System: Sister, brother-in-law, ex .  Coping/Bonding between mother & baby: N/A  Source of Feeding: N/A  Supplies for Infant: Sister has obtained lots of supplies for baby. Crib, car seat, diapers, clothes, strollers     Mom's Insurance: Medicaid FFS  Baby Covered on Insurance: Will be covered under sister's insurance  Mother Employed/School: No  Other children in the home/names & ages: Cornelio (8)     Financial Hardship/Income: Not currently employed  Mom's Mental status: A&Ox4     CPS History: No  Psychiatric History: Situational depression after divorce  Domestic Violence History: No  Drug/ETOH History: No     Resources Provided: Adoption agency list  Referrals Made: None

## 2017-10-30 LAB
ALBUMIN SERPL BCP-MCNC: 2.8 G/DL (ref 3.2–4.9)
ALBUMIN/GLOB SERPL: 0.9 G/DL
ALP SERPL-CCNC: 150 U/L (ref 30–99)
ALT SERPL-CCNC: 21 U/L (ref 2–50)
ANION GAP SERPL CALC-SCNC: 10 MMOL/L (ref 0–11.9)
AST SERPL-CCNC: 38 U/L (ref 12–45)
BILIRUB SERPL-MCNC: 0.4 MG/DL (ref 0.1–1.5)
BUN SERPL-MCNC: 19 MG/DL (ref 8–22)
CALCIUM SERPL-MCNC: 8.6 MG/DL (ref 8.5–10.5)
CHLORIDE SERPL-SCNC: 103 MMOL/L (ref 96–112)
CO2 SERPL-SCNC: 18 MMOL/L (ref 20–33)
CREAT SERPL-MCNC: 0.98 MG/DL (ref 0.5–1.4)
ERYTHROCYTE [DISTWIDTH] IN BLOOD BY AUTOMATED COUNT: 71 FL (ref 35.9–50)
GFR SERPL CREATININE-BSD FRML MDRD: >60 ML/MIN/1.73 M 2
GLOBULIN SER CALC-MCNC: 3.1 G/DL (ref 1.9–3.5)
GLUCOSE SERPL-MCNC: 108 MG/DL (ref 65–99)
HCT VFR BLD AUTO: 31.6 % (ref 37–47)
HGB BLD-MCNC: 9.2 G/DL (ref 12–16)
MCH RBC QN AUTO: 21.3 PG (ref 27–33)
MCHC RBC AUTO-ENTMCNC: 29.1 G/DL (ref 33.6–35)
MCV RBC AUTO: 73.1 FL (ref 81.4–97.8)
PLATELET # BLD AUTO: 220 K/UL (ref 164–446)
PMV BLD AUTO: 10.9 FL (ref 9–12.9)
POTASSIUM SERPL-SCNC: 3.9 MMOL/L (ref 3.6–5.5)
PROT SERPL-MCNC: 5.9 G/DL (ref 6–8.2)
RBC # BLD AUTO: 4.32 M/UL (ref 4.2–5.4)
SODIUM SERPL-SCNC: 131 MMOL/L (ref 135–145)
WBC # BLD AUTO: 10.6 K/UL (ref 4.8–10.8)

## 2017-10-30 PROCEDURE — 700102 HCHG RX REV CODE 250 W/ 637 OVERRIDE(OP): Performed by: OBSTETRICS & GYNECOLOGY

## 2017-10-30 PROCEDURE — 700112 HCHG RX REV CODE 229: Performed by: OBSTETRICS & GYNECOLOGY

## 2017-10-30 PROCEDURE — 85027 COMPLETE CBC AUTOMATED: CPT

## 2017-10-30 PROCEDURE — 59025 FETAL NON-STRESS TEST: CPT | Performed by: OBSTETRICS & GYNECOLOGY

## 2017-10-30 PROCEDURE — 770002 HCHG ROOM/CARE - OB PRIVATE (112)

## 2017-10-30 PROCEDURE — 80053 COMPREHEN METABOLIC PANEL: CPT

## 2017-10-30 PROCEDURE — A9270 NON-COVERED ITEM OR SERVICE: HCPCS | Performed by: OBSTETRICS & GYNECOLOGY

## 2017-10-30 PROCEDURE — 36415 COLL VENOUS BLD VENIPUNCTURE: CPT

## 2017-10-30 PROCEDURE — 302790 HCHG STAT ANTEPARTUM CARE, DAILY

## 2017-10-30 RX ORDER — LABETALOL 100 MG/1
200 TABLET, FILM COATED ORAL EVERY 8 HOURS
Status: DISCONTINUED | OUTPATIENT
Start: 2017-10-30 | End: 2017-11-05

## 2017-10-30 RX ORDER — DIPHENHYDRAMINE HCL 25 MG
25 TABLET ORAL NIGHTLY PRN
Status: DISCONTINUED | OUTPATIENT
Start: 2017-10-30 | End: 2017-11-12

## 2017-10-30 RX ADMIN — Medication 1 TABLET: at 09:11

## 2017-10-30 RX ADMIN — LABETALOL HYDROCHLORIDE 200 MG: 100 TABLET, FILM COATED ORAL at 17:05

## 2017-10-30 RX ADMIN — DOCUSATE SODIUM 100 MG: 100 CAPSULE ORAL at 09:11

## 2017-10-30 RX ADMIN — LABETALOL HYDROCHLORIDE 100 MG: 100 TABLET, FILM COATED ORAL at 09:11

## 2017-10-30 RX ADMIN — FERROUS GLUCONATE 324 MG: 324 TABLET ORAL at 07:58

## 2017-10-30 RX ADMIN — LABETALOL HYDROCHLORIDE 100 MG: 100 TABLET, FILM COATED ORAL at 00:56

## 2017-10-30 ASSESSMENT — PATIENT HEALTH QUESTIONNAIRE - PHQ9
SUM OF ALL RESPONSES TO PHQ QUESTIONS 1-9: 0
2. FEELING DOWN, DEPRESSED, IRRITABLE, OR HOPELESS: NOT AT ALL
SUM OF ALL RESPONSES TO PHQ9 QUESTIONS 1 AND 2: 0
SUM OF ALL RESPONSES TO PHQ9 QUESTIONS 1 AND 2: 0
SUM OF ALL RESPONSES TO PHQ QUESTIONS 1-9: 0
1. LITTLE INTEREST OR PLEASURE IN DOING THINGS: NOT AT ALL
2. FEELING DOWN, DEPRESSED, IRRITABLE, OR HOPELESS: NOT AT ALL
1. LITTLE INTEREST OR PLEASURE IN DOING THINGS: NOT AT ALL

## 2017-10-30 ASSESSMENT — LIFESTYLE VARIABLES
DO YOU DRINK ALCOHOL: NO
DO YOU DRINK ALCOHOL: NO

## 2017-10-30 ASSESSMENT — COPD QUESTIONNAIRES
HAVE YOU SMOKED AT LEAST 100 CIGARETTES IN YOUR ENTIRE LIFE: NO/DON'T KNOW
DURING THE PAST 4 WEEKS HOW MUCH DID YOU FEEL SHORT OF BREATH: NONE/LITTLE OF THE TIME
DO YOU EVER COUGH UP ANY MUCUS OR PHLEGM?: NO/ONLY WITH OCCASIONAL COLDS OR INFECTIONS
COPD SCREENING SCORE: 0

## 2017-10-30 ASSESSMENT — PAIN SCALES - GENERAL
PAINLEVEL_OUTOF10: 0

## 2017-10-30 NOTE — PROGRESS NOTES
0699 Report received from SIMÓN Mancuso RN. 0800 Patient sleeping, Ferrous gluconate given with breakfast. Patient states she would like to sleep more. RN will return at 0900 with morning meds and for assessment/vitals. Patient agrees with POC, patient encouraged to call with any needs/concerns. 1155 B/P 166/92, RN not notified by CNA. 1220 Dr. Kathleen at RN station, reviewed 1155 B/P, MD asked B/P to be retaken, 141/86. Order received for stat PIH labs and will increase labetalol to 200 mg every 8 hours. 1345 PIH labs WNL, Dr. Kathleen called/message left. 1420 Dr. Kathleen called/updated on PIH labs, will notify MD with B/P 160/110, will continue to monitor. 8841 Report given to SIMÓN Mancuso RN.

## 2017-10-30 NOTE — PROGRESS NOTES
Report received and plan of care discussed.  GA = 32.5 wks.  Patient awake, in good spirits, denies c/o headache, blurred vision, dizziness, or epigastric pain.  Denies feeling UC's, and states baby is moving a lot and well.  Denies vaginal bleeding or leaking of amniotic fluid.   2100--Fetal moitoring finished.  Patient remains comfortable.   0100--Pateint has been sleeping.  No apparent distress.  States baby is moving, denies headache, blurred vision, dizziness, or epigastric pain.  Denies UC's, vaginal bleeding or leaking.    0500--Patient states she is feeling fine.  No change in patient assessment.  States baby has been moving.  No complaints @ this time.  0700--Report to oncoming RN and plan of care discussed.

## 2017-10-30 NOTE — CARE PLAN
Problem: Psychosocial Needs:  Goal: Level of anxiety will decrease  Outcome: PROGRESSING AS EXPECTED  Patient gets anxious when her family visits.  Better when they are gone.

## 2017-10-30 NOTE — CARE PLAN
Problem: Cardiac Output  Goal: Patient will remain normotensive throughout hospitalization  Outcome: PROGRESSING AS EXPECTED  Patient had to have an increase in BP meds to q8 hrs.  BP in acceptable range at this time.

## 2017-10-30 NOTE — CARE PLAN
Problem: Risk for Infection, Impaired Wound Healing  Goal: Remain free from signs and symptoms of infection    Intervention: Infection prevention measures  Patient educated on the importance of hand hygiene in order to prevent infection.

## 2017-10-30 NOTE — CARE PLAN
Problem: Pain  Goal: Alleviation of Pain or a reduction in pain to the patient's comfort goal    Intervention: Initial pain assessment  Patient denies any complains of pain, patient educated on pain scale. Patient will notify RN with any changes to pain status.

## 2017-10-30 NOTE — PROGRESS NOTES
S: No complaints.  Feels well.    O: Afebrile  BP: 141/86mmHg;  BP: earlier 166/92 mmHg       No change to physical status No edema.  Reflexes: 1+            EFM reactive     A:  IUP 32 5/7 weeks       Preeclampsia, rule out renal disease       IUGR       Iron deficiency  P:  Recheck lab        Labetalol 200 mg TID        Stat labs     Time: 15minutes

## 2017-10-31 PROCEDURE — 700112 HCHG RX REV CODE 229: Performed by: OBSTETRICS & GYNECOLOGY

## 2017-10-31 PROCEDURE — 302790 HCHG STAT ANTEPARTUM CARE, DAILY

## 2017-10-31 PROCEDURE — A9270 NON-COVERED ITEM OR SERVICE: HCPCS | Performed by: OBSTETRICS & GYNECOLOGY

## 2017-10-31 PROCEDURE — 700102 HCHG RX REV CODE 250 W/ 637 OVERRIDE(OP): Performed by: OBSTETRICS & GYNECOLOGY

## 2017-10-31 PROCEDURE — 770002 HCHG ROOM/CARE - OB PRIVATE (112)

## 2017-10-31 PROCEDURE — 59025 FETAL NON-STRESS TEST: CPT | Performed by: OBSTETRICS & GYNECOLOGY

## 2017-10-31 RX ADMIN — FERROUS GLUCONATE 324 MG: 324 TABLET ORAL at 08:10

## 2017-10-31 RX ADMIN — LABETALOL HYDROCHLORIDE 200 MG: 100 TABLET, FILM COATED ORAL at 08:10

## 2017-10-31 RX ADMIN — Medication 1 TABLET: at 08:10

## 2017-10-31 RX ADMIN — DOCUSATE SODIUM 100 MG: 100 CAPSULE ORAL at 08:10

## 2017-10-31 RX ADMIN — LABETALOL HYDROCHLORIDE 200 MG: 100 TABLET, FILM COATED ORAL at 16:34

## 2017-10-31 RX ADMIN — LABETALOL HYDROCHLORIDE 200 MG: 100 TABLET, FILM COATED ORAL at 01:01

## 2017-10-31 ASSESSMENT — PAIN SCALES - GENERAL
PAINLEVEL_OUTOF10: 0

## 2017-10-31 ASSESSMENT — PATIENT HEALTH QUESTIONNAIRE - PHQ9
SUM OF ALL RESPONSES TO PHQ9 QUESTIONS 1 AND 2: 0
2. FEELING DOWN, DEPRESSED, IRRITABLE, OR HOPELESS: NOT AT ALL
SUM OF ALL RESPONSES TO PHQ QUESTIONS 1-9: 0
1. LITTLE INTEREST OR PLEASURE IN DOING THINGS: NOT AT ALL

## 2017-10-31 NOTE — DISCHARGE PLANNING
:    Ongoing:  Received a referral to assist patient with resources.  Met with patient, Stephanie Walls who is 32.6 weeks pregnant with her second child.  Stephanie has an 8 year old son that is currently living with is father while she is here in the hospital.  Patient stated she is still wanting her sister and brother-in-law to adopt this baby.  They are Brandon and Hima Singletary.  Their phone number is [phone] and patient could not remember their address.  They do live in [facility]).  They have contacted an  (patient did not know the name) who is assisting them with the process.  Patient did not have any questions at this time and declines needing resources.  SW did provide patient with a list of attorneys that specialize in adoptions and left this SW's contact info in case her sister had any questions.      Plan:  Follow and assist as needed.

## 2017-10-31 NOTE — PROGRESS NOTES
1900--Report received and plan of care discussed.  GA = 32.5 wks.  Patient awake, alert.  Denies headache, blurred vision, dizziness, or epigastric pain.  Denies UC's, vaginal bleeding or leaking.  States baby has been moving well.  0100--Patient doing well.  Sleeping without pain or anxiety.  States she is feeling the baby moving.  Denies feeling UC's.  Returned to sleep.  0500--Patient remains the same.  No problems voiced.  0700--Report to oncoming RN and plan of care discussed.

## 2017-10-31 NOTE — CARE PLAN
Problem: Psychosocial needs  Goal: Anxiety reduction  Outcome: PROGRESSING AS EXPECTED  Patient given quiet atmosphere, reassured and Benadryl ordered.  Patient given choices in her care where possible.  Doing well.

## 2017-10-31 NOTE — CARE PLAN
Problem: Risk for Infection, Impaired Wound Healing  Goal: Remain free from signs and symptoms of infection  Outcome: PROGRESSING AS EXPECTED  Patient is afebrile. No S&S of infections.     Problem: Pain  Goal: Alleviation of Pain or a reduction in pain to the patient's comfort goal  Outcome: PROGRESSING AS EXPECTED  Patient denies any pain or contractions. Patient instructed to notify Rn if any pain develops

## 2017-10-31 NOTE — CARE PLAN
Problem: Powerlessness  Goal: Patient will express individual needs/desires  Outcome: PROGRESSING AS EXPECTED  Patient participating in her own POC.  All questions asked and answered to her understanding.  Doing well.

## 2017-10-31 NOTE — PROGRESS NOTES
07- Report received from SIMÓN Mancuso RN. Plan of care assumed. Patient is a , edc  making her 32.6 weeks.    0810- Assessment done. Patient denies any contractions, leaking of any fluid or any vaginal bleeding. States positive fetal movement. Patient denies any HA, vision changes or epigastric pain. States she is tired today. Patient resting.     1115- Dr Kathleen called about fetal heart rate tracing. Order received for patient to be on continuous monitoring. Discussed plan of care with patient. Patient agrees with plan of care.     1155- Dr Kathleen called about fetal tracing. Update given. Will be by to do a BPP. Informed patient of plan of care. /99 while patient is sitting up eating. Serial BP's started     1210- Dr Kathleen in to see patient. Strip reviewed.updated on blood pressures Ultrasound at bedside.     1225- Bpp . Order received to stay on continuous monitoring. EFM reapplied. Updated on /90's. Dr Kathleen okay with that     - Dr Kathleen updated on fetal tracing and occasional variables. No new orders. Will continue to monitor.     - report given to ANGELES Quintanilla RN

## 2017-10-31 NOTE — PROGRESS NOTES
S: Feels well.  No headache or epigastric pain.  O: Afebrile      BP: 133/87       Edema: trace      Reflexes: 1-2+      EFM yesterday reactive      No labs done today as yesterday was stable     MEDS:  Labetalol 200 mg TID  A: IUP 32 6/7 weeks      Preeclampsia, possible underlying renal disease      IUGR      Iron deficiency anemia  P:  Continue with fetal surveillance and laboratory monitoring.  Delivery if severe preeclampsia.    Time:  15 minutes

## 2017-11-01 LAB
ALBUMIN SERPL BCP-MCNC: 2.7 G/DL (ref 3.2–4.9)
ALBUMIN/GLOB SERPL: 0.9 G/DL
ALP SERPL-CCNC: 149 U/L (ref 30–99)
ALT SERPL-CCNC: 18 U/L (ref 2–50)
ANION GAP SERPL CALC-SCNC: 8 MMOL/L (ref 0–11.9)
AST SERPL-CCNC: 26 U/L (ref 12–45)
BILIRUB SERPL-MCNC: 0.3 MG/DL (ref 0.1–1.5)
BUN SERPL-MCNC: 19 MG/DL (ref 8–22)
CALCIUM SERPL-MCNC: 8.2 MG/DL (ref 8.5–10.5)
CHLORIDE SERPL-SCNC: 105 MMOL/L (ref 96–112)
CO2 SERPL-SCNC: 20 MMOL/L (ref 20–33)
CREAT SERPL-MCNC: 0.85 MG/DL (ref 0.5–1.4)
ERYTHROCYTE [DISTWIDTH] IN BLOOD BY AUTOMATED COUNT: 71.6 FL (ref 35.9–50)
GFR SERPL CREATININE-BSD FRML MDRD: >60 ML/MIN/1.73 M 2
GLOBULIN SER CALC-MCNC: 3.1 G/DL (ref 1.9–3.5)
GLUCOSE SERPL-MCNC: 69 MG/DL (ref 65–99)
HCT VFR BLD AUTO: 30.8 % (ref 37–47)
HGB BLD-MCNC: 9 G/DL (ref 12–16)
MCH RBC QN AUTO: 21.3 PG (ref 27–33)
MCHC RBC AUTO-ENTMCNC: 29.2 G/DL (ref 33.6–35)
MCV RBC AUTO: 72.8 FL (ref 81.4–97.8)
PLATELET # BLD AUTO: 240 K/UL (ref 164–446)
PMV BLD AUTO: 10.4 FL (ref 9–12.9)
POTASSIUM SERPL-SCNC: 4.2 MMOL/L (ref 3.6–5.5)
PROT SERPL-MCNC: 5.8 G/DL (ref 6–8.2)
RBC # BLD AUTO: 4.23 M/UL (ref 4.2–5.4)
SODIUM SERPL-SCNC: 133 MMOL/L (ref 135–145)
WBC # BLD AUTO: 10.1 K/UL (ref 4.8–10.8)

## 2017-11-01 PROCEDURE — 36415 COLL VENOUS BLD VENIPUNCTURE: CPT

## 2017-11-01 PROCEDURE — 80053 COMPREHEN METABOLIC PANEL: CPT

## 2017-11-01 PROCEDURE — 302790 HCHG STAT ANTEPARTUM CARE, DAILY

## 2017-11-01 PROCEDURE — 700102 HCHG RX REV CODE 250 W/ 637 OVERRIDE(OP): Performed by: OBSTETRICS & GYNECOLOGY

## 2017-11-01 PROCEDURE — A9270 NON-COVERED ITEM OR SERVICE: HCPCS | Performed by: OBSTETRICS & GYNECOLOGY

## 2017-11-01 PROCEDURE — 770002 HCHG ROOM/CARE - OB PRIVATE (112)

## 2017-11-01 PROCEDURE — 700112 HCHG RX REV CODE 229: Performed by: OBSTETRICS & GYNECOLOGY

## 2017-11-01 PROCEDURE — 85027 COMPLETE CBC AUTOMATED: CPT

## 2017-11-01 RX ADMIN — FERROUS GLUCONATE 324 MG: 324 TABLET ORAL at 08:03

## 2017-11-01 RX ADMIN — DOCUSATE SODIUM 100 MG: 100 CAPSULE ORAL at 09:19

## 2017-11-01 RX ADMIN — Medication 1 TABLET: at 09:19

## 2017-11-01 RX ADMIN — LABETALOL HYDROCHLORIDE 200 MG: 100 TABLET, FILM COATED ORAL at 09:19

## 2017-11-01 RX ADMIN — LABETALOL HYDROCHLORIDE 200 MG: 100 TABLET, FILM COATED ORAL at 00:20

## 2017-11-01 RX ADMIN — LABETALOL HYDROCHLORIDE 200 MG: 100 TABLET, FILM COATED ORAL at 17:17

## 2017-11-01 ASSESSMENT — PATIENT HEALTH QUESTIONNAIRE - PHQ9
SUM OF ALL RESPONSES TO PHQ9 QUESTIONS 1 AND 2: 0
1. LITTLE INTEREST OR PLEASURE IN DOING THINGS: NOT AT ALL
2. FEELING DOWN, DEPRESSED, IRRITABLE, OR HOPELESS: NOT AT ALL
2. FEELING DOWN, DEPRESSED, IRRITABLE, OR HOPELESS: NOT AT ALL
1. LITTLE INTEREST OR PLEASURE IN DOING THINGS: NOT AT ALL
SUM OF ALL RESPONSES TO PHQ9 QUESTIONS 1 AND 2: 0
SUM OF ALL RESPONSES TO PHQ QUESTIONS 1-9: 0
SUM OF ALL RESPONSES TO PHQ QUESTIONS 1-9: 0

## 2017-11-01 ASSESSMENT — PAIN SCALES - GENERAL
PAINLEVEL_OUTOF10: 0

## 2017-11-01 ASSESSMENT — LIFESTYLE VARIABLES: DO YOU DRINK ALCOHOL: NO

## 2017-11-01 ASSESSMENT — COPD QUESTIONNAIRES
DO YOU EVER COUGH UP ANY MUCUS OR PHLEGM?: NO/ONLY WITH OCCASIONAL COLDS OR INFECTIONS
COPD SCREENING SCORE: 0
HAVE YOU SMOKED AT LEAST 100 CIGARETTES IN YOUR ENTIRE LIFE: NO/DON'T KNOW
DURING THE PAST 4 WEEKS HOW MUCH DID YOU FEEL SHORT OF BREATH: NONE/LITTLE OF THE TIME

## 2017-11-01 NOTE — PROGRESS NOTES
; EDC ; EGA 32.6     - Report received from RICKY Piedra RN. Pt resting comfortably in bed. Pt denies any UCs, LOF, or VB. Pt states + FM. Pt denies any ALVA, visual disturbances, epigastric pain. POC discussed, questions answered.    - Pt with broken tracing when back in bed from bathroom, possible deceleration. FHT resolved back to baseline with no intervention.   0000 - Pt sleeping  0400 - Pt sleeping  0630 - Dr. Piedra updated in department.

## 2017-11-01 NOTE — PROGRESS NOTES
Called by RN at noon for nonreactive EFM.  Performed BPP at 1210pm with 8/8.  FHR deceleration noted during the examination.  Continuous EFM ordered.

## 2017-11-01 NOTE — PROGRESS NOTES
0700 Received report from Anabel DONALDSON, poc/orders reviewed, care assumed. 0803 Patient resting in bed, eating breakfast, Ferrous gluconate given. Patient denies any needs/concerns at this time. 1320 Variable decles noted, Dr. Piedra called, asked by RN to review strip. MD will review strip and will call back. 1341 Dr. Piedra phoned, in reassured with tracing, will continue to monitor, will notify MD with repetitive variables, no accels, or decreased variability. 1900 Report given to Cindy DONALDSON.

## 2017-11-01 NOTE — CARE PLAN
Problem: Risk for Infection, Impaired Wound Healing  Goal: Remain free from signs and symptoms of infection    Intervention: Infection prevention measures  CDC recommendation for personal and hand hygiene implemented, patient understands importance.

## 2017-11-01 NOTE — CARE PLAN
Problem: Infection  Goal: Will remain free from infection  Outcome: PROGRESSING AS EXPECTED  Pt remains free from s/s of infection. VSS, afebrile.     Problem: Pain  Goal: Alleviation of Pain or a reduction in pain to the patient's comfort goal  Outcome: PROGRESSING AS EXPECTED  Pt denies pain at this time, rates pain 0 on 0-10 scale. Not feeling any UCs.

## 2017-11-01 NOTE — CARE PLAN
Problem: Pain  Goal: Alleviation of Pain or a reduction in pain to the patient's comfort goal    Intervention: Initial pain assessment  Patient denies any complains of pain, patient educated on pain scale, patient will notify RN with any changes to pain scale.

## 2017-11-01 NOTE — PROGRESS NOTES
Patient seen and evaluated  S: no complaints  Specifically: no h/a, vis change, RUQ pain  Good fetal movement  O: AF VSS  124//83  H/H this AM: 9.0/30.8  Plt: 240  OT/PT: 26/18  Fetal monitor: 145 with no decels  BPP last PM 8/8    O: 33 0/7 weeks  PIH with renal component  Anemia  IUGR  P; cpm

## 2017-11-02 PROCEDURE — 700102 HCHG RX REV CODE 250 W/ 637 OVERRIDE(OP): Performed by: OBSTETRICS & GYNECOLOGY

## 2017-11-02 PROCEDURE — 700112 HCHG RX REV CODE 229: Performed by: OBSTETRICS & GYNECOLOGY

## 2017-11-02 PROCEDURE — 302790 HCHG STAT ANTEPARTUM CARE, DAILY

## 2017-11-02 PROCEDURE — A9270 NON-COVERED ITEM OR SERVICE: HCPCS | Performed by: OBSTETRICS & GYNECOLOGY

## 2017-11-02 PROCEDURE — 700105 HCHG RX REV CODE 258: Performed by: MEDICAL GENETICS

## 2017-11-02 PROCEDURE — 770002 HCHG ROOM/CARE - OB PRIVATE (112)

## 2017-11-02 RX ORDER — SODIUM CHLORIDE, SODIUM LACTATE, POTASSIUM CHLORIDE, CALCIUM CHLORIDE 600; 310; 30; 20 MG/100ML; MG/100ML; MG/100ML; MG/100ML
500 INJECTION, SOLUTION INTRAVENOUS ONCE
Status: COMPLETED | OUTPATIENT
Start: 2017-11-02 | End: 2017-11-02

## 2017-11-02 RX ORDER — SODIUM CHLORIDE, SODIUM LACTATE, POTASSIUM CHLORIDE, CALCIUM CHLORIDE 600; 310; 30; 20 MG/100ML; MG/100ML; MG/100ML; MG/100ML
INJECTION, SOLUTION INTRAVENOUS ONCE
Status: DISCONTINUED | OUTPATIENT
Start: 2017-11-02 | End: 2017-11-02

## 2017-11-02 RX ADMIN — FERROUS GLUCONATE 324 MG: 324 TABLET ORAL at 07:29

## 2017-11-02 RX ADMIN — LABETALOL HYDROCHLORIDE 200 MG: 100 TABLET, FILM COATED ORAL at 09:08

## 2017-11-02 RX ADMIN — LABETALOL HYDROCHLORIDE 200 MG: 100 TABLET, FILM COATED ORAL at 00:33

## 2017-11-02 RX ADMIN — Medication 1 TABLET: at 09:08

## 2017-11-02 RX ADMIN — DOCUSATE SODIUM 100 MG: 100 CAPSULE ORAL at 09:08

## 2017-11-02 RX ADMIN — LABETALOL HYDROCHLORIDE 200 MG: 100 TABLET, FILM COATED ORAL at 17:36

## 2017-11-02 RX ADMIN — SODIUM CHLORIDE, POTASSIUM CHLORIDE, SODIUM LACTATE AND CALCIUM CHLORIDE 500 ML: 600; 310; 30; 20 INJECTION, SOLUTION INTRAVENOUS at 15:10

## 2017-11-02 ASSESSMENT — PAIN SCALES - GENERAL
PAINLEVEL_OUTOF10: 0

## 2017-11-02 NOTE — PROGRESS NOTES
Occasional variable shaped decels.  EFW: 1205g (<10%ile)   PEG: 12.7cm  Doppler of UA elevated (S/D: 7.18)  BPP 8/8    Will follow with daily BPPs

## 2017-11-02 NOTE — CARE PLAN
Problem: Infection  Goal: Will remain free from infection    Intervention: Assess signs and symptoms of infection  No s/s of infection. Pt remains afebrile. VSS      Problem: Pain  Goal: Alleviation of Pain or a reduction in pain to the patient's comfort goal    Intervention: Initial pain assessment  Pt denies pain at this time.

## 2017-11-02 NOTE — PROGRESS NOTES
Patient seen and evaluated  S: sleeping  Quiet nite per RN  Good fetal movement per RN  O: AF VSS  152/77  NST in progress: reactive with rare variable decels  PIH labs qod  A: 33 1/7 weeks  PIH/ renal  Anemia  IUGR  P: will plan BPP as indicated

## 2017-11-02 NOTE — PROGRESS NOTES
1900-rcvd report from dayshift RN and assumed care of pt.  2000-VSS. Pt talking with mother on phone. Pt in great spirits.  2230-monitors removed per Dr. Piedra ( Told will DONALDSON if we had not heard from him by 2200 we could take her off of continuous monitoring)  0445-Dr. Piedra called RN to put pt back on monitor. Will be in to see pt in an hour and a half or so.  0700-report given to dayshift RN

## 2017-11-02 NOTE — CARE PLAN
Problem: Infection  Goal: Will remain free from infection  Outcome: PROGRESSING AS EXPECTED  Monitor for S&S of infection such as fever    Problem: Pain Management  Goal: Pain level will decrease to patient's comfort goal  Outcome: PROGRESSING AS EXPECTED  Pain assessment, medication as needed

## 2017-11-03 PROCEDURE — 700102 HCHG RX REV CODE 250 W/ 637 OVERRIDE(OP): Performed by: OBSTETRICS & GYNECOLOGY

## 2017-11-03 PROCEDURE — 770002 HCHG ROOM/CARE - OB PRIVATE (112)

## 2017-11-03 PROCEDURE — A9270 NON-COVERED ITEM OR SERVICE: HCPCS | Performed by: OBSTETRICS & GYNECOLOGY

## 2017-11-03 PROCEDURE — 302790 HCHG STAT ANTEPARTUM CARE, DAILY

## 2017-11-03 PROCEDURE — 700112 HCHG RX REV CODE 229: Performed by: OBSTETRICS & GYNECOLOGY

## 2017-11-03 RX ADMIN — LABETALOL HYDROCHLORIDE 200 MG: 100 TABLET, FILM COATED ORAL at 08:27

## 2017-11-03 RX ADMIN — LABETALOL HYDROCHLORIDE 200 MG: 100 TABLET, FILM COATED ORAL at 01:42

## 2017-11-03 RX ADMIN — LABETALOL HYDROCHLORIDE 200 MG: 100 TABLET, FILM COATED ORAL at 16:55

## 2017-11-03 RX ADMIN — Medication 1 TABLET: at 08:28

## 2017-11-03 RX ADMIN — FERROUS GLUCONATE 324 MG: 324 TABLET ORAL at 08:27

## 2017-11-03 RX ADMIN — DOCUSATE SODIUM 100 MG: 100 CAPSULE ORAL at 08:27

## 2017-11-03 ASSESSMENT — PATIENT HEALTH QUESTIONNAIRE - PHQ9
1. LITTLE INTEREST OR PLEASURE IN DOING THINGS: NOT AT ALL
SUM OF ALL RESPONSES TO PHQ QUESTIONS 1-9: 0
2. FEELING DOWN, DEPRESSED, IRRITABLE, OR HOPELESS: NOT AT ALL
SUM OF ALL RESPONSES TO PHQ9 QUESTIONS 1 AND 2: 0

## 2017-11-03 ASSESSMENT — COPD QUESTIONNAIRES
COPD SCREENING SCORE: 0
DURING THE PAST 4 WEEKS HOW MUCH DID YOU FEEL SHORT OF BREATH: NONE/LITTLE OF THE TIME
HAVE YOU SMOKED AT LEAST 100 CIGARETTES IN YOUR ENTIRE LIFE: NO/DON'T KNOW
DO YOU EVER COUGH UP ANY MUCUS OR PHLEGM?: NO/ONLY WITH OCCASIONAL COLDS OR INFECTIONS

## 2017-11-03 ASSESSMENT — PAIN SCALES - GENERAL: PAINLEVEL_OUTOF10: 0

## 2017-11-03 ASSESSMENT — LIFESTYLE VARIABLES: DO YOU DRINK ALCOHOL: NO

## 2017-11-03 NOTE — PROGRESS NOTES
1900-rcvd report from dayshift RN and assumed care of pt.  2000-assessment completed. Pt feeling much better. Pt had a rough day with sadness and anxiety.   0400-Dr. Piedra called and orders vd to place pt back on monitors.  0600-pt with uneventful night. No complaints.  0700-report given to dayshift RN

## 2017-11-03 NOTE — PROGRESS NOTES
Patient seen and evaluated  S: sleeping  Quiet nite per RN  Good fetal movement per RN  O: AF VSS  129//90  NST in progress: reactive with rare variable decels  PIH labs qod  A: 33 2/7 weeks  PIH/ renal  Anemia  IUGR  P: will plan BPP as indicated

## 2017-11-03 NOTE — PROGRESS NOTES
0700  Report received from Cindy DONALDSON.  Pt sleeping in bed, call light in reach, will continue to monitor  1900  Report given to Kathie DONALDSON

## 2017-11-03 NOTE — PROGRESS NOTES
BPP 8/8  Doppler of UA now normal S/D 3.32  PEG normal 14.4    Will repeat BPP in 24 hours  PIH labs in AM

## 2017-11-03 NOTE — CARE PLAN
Problem: Risk for Infection, Impaired Wound Healing  Goal: Remain free from signs and symptoms of infection  Outcome: PROGRESSING AS EXPECTED  Pt is afebrile at this time    Problem: Pain  Goal: Alleviation of Pain or a reduction in pain to the patient's comfort goal  Outcome: PROGRESSING AS EXPECTED  Pt states no pain at this time, will continue to monitor

## 2017-11-04 LAB
ALBUMIN SERPL BCP-MCNC: 2.8 G/DL (ref 3.2–4.9)
ALBUMIN/GLOB SERPL: 0.9 G/DL
ALP SERPL-CCNC: 163 U/L (ref 30–99)
ALT SERPL-CCNC: 21 U/L (ref 2–50)
ANION GAP SERPL CALC-SCNC: 7 MMOL/L (ref 0–11.9)
ANISOCYTOSIS BLD QL SMEAR: ABNORMAL
AST SERPL-CCNC: 39 U/L (ref 12–45)
BASOPHILS # BLD AUTO: 1 % (ref 0–1.8)
BASOPHILS # BLD: 0.1 K/UL (ref 0–0.12)
BILIRUB SERPL-MCNC: 0.2 MG/DL (ref 0.1–1.5)
BUN SERPL-MCNC: 15 MG/DL (ref 8–22)
CALCIUM SERPL-MCNC: 8.6 MG/DL (ref 8.5–10.5)
CHLORIDE SERPL-SCNC: 105 MMOL/L (ref 96–112)
CO2 SERPL-SCNC: 20 MMOL/L (ref 20–33)
COMMENT 1642: NORMAL
CREAT SERPL-MCNC: 0.76 MG/DL (ref 0.5–1.4)
EOSINOPHIL # BLD AUTO: 0.17 K/UL (ref 0–0.51)
EOSINOPHIL NFR BLD: 1.7 % (ref 0–6.9)
ERYTHROCYTE [DISTWIDTH] IN BLOOD BY AUTOMATED COUNT: 72.1 FL (ref 35.9–50)
GFR SERPL CREATININE-BSD FRML MDRD: >60 ML/MIN/1.73 M 2
GLOBULIN SER CALC-MCNC: 3 G/DL (ref 1.9–3.5)
GLUCOSE SERPL-MCNC: 73 MG/DL (ref 65–99)
HCT VFR BLD AUTO: 31.9 % (ref 37–47)
HGB BLD-MCNC: 9.4 G/DL (ref 12–16)
HYPOCHROMIA BLD QL SMEAR: ABNORMAL
IMM GRANULOCYTES # BLD AUTO: 0.16 K/UL (ref 0–0.11)
IMM GRANULOCYTES NFR BLD AUTO: 1.6 % (ref 0–0.9)
LYMPHOCYTES # BLD AUTO: 3.02 K/UL (ref 1–4.8)
LYMPHOCYTES NFR BLD: 29.6 % (ref 22–41)
MCH RBC QN AUTO: 21.9 PG (ref 27–33)
MCHC RBC AUTO-ENTMCNC: 29.5 G/DL (ref 33.6–35)
MCV RBC AUTO: 74.2 FL (ref 81.4–97.8)
MICROCYTES BLD QL SMEAR: ABNORMAL
MONOCYTES # BLD AUTO: 0.41 K/UL (ref 0–0.85)
MONOCYTES NFR BLD AUTO: 4 % (ref 0–13.4)
MORPHOLOGY BLD-IMP: NORMAL
NEUTROPHILS # BLD AUTO: 6.35 K/UL (ref 2–7.15)
NEUTROPHILS NFR BLD: 62.1 % (ref 44–72)
NRBC # BLD AUTO: 0.02 K/UL
NRBC BLD AUTO-RTO: 0.2 /100 WBC
PLATELET # BLD AUTO: 263 K/UL (ref 164–446)
PLATELET BLD QL SMEAR: NORMAL
PMV BLD AUTO: 10.5 FL (ref 9–12.9)
POLYCHROMASIA BLD QL SMEAR: NORMAL
POTASSIUM SERPL-SCNC: 4.4 MMOL/L (ref 3.6–5.5)
PROT SERPL-MCNC: 5.8 G/DL (ref 6–8.2)
RBC # BLD AUTO: 4.3 M/UL (ref 4.2–5.4)
RBC BLD AUTO: PRESENT
SODIUM SERPL-SCNC: 132 MMOL/L (ref 135–145)
URATE SERPL-MCNC: 6.1 MG/DL (ref 1.9–8.2)
WBC # BLD AUTO: 10.2 K/UL (ref 4.8–10.8)

## 2017-11-04 PROCEDURE — 84550 ASSAY OF BLOOD/URIC ACID: CPT

## 2017-11-04 PROCEDURE — A9270 NON-COVERED ITEM OR SERVICE: HCPCS | Performed by: MEDICAL GENETICS

## 2017-11-04 PROCEDURE — 36415 COLL VENOUS BLD VENIPUNCTURE: CPT

## 2017-11-04 PROCEDURE — A9270 NON-COVERED ITEM OR SERVICE: HCPCS | Performed by: OBSTETRICS & GYNECOLOGY

## 2017-11-04 PROCEDURE — 302790 HCHG STAT ANTEPARTUM CARE, DAILY

## 2017-11-04 PROCEDURE — 700101 HCHG RX REV CODE 250: Performed by: MEDICAL GENETICS

## 2017-11-04 PROCEDURE — 700112 HCHG RX REV CODE 229: Performed by: OBSTETRICS & GYNECOLOGY

## 2017-11-04 PROCEDURE — 700102 HCHG RX REV CODE 250 W/ 637 OVERRIDE(OP): Performed by: OBSTETRICS & GYNECOLOGY

## 2017-11-04 PROCEDURE — 80053 COMPREHEN METABOLIC PANEL: CPT

## 2017-11-04 PROCEDURE — 85025 COMPLETE CBC W/AUTO DIFF WBC: CPT

## 2017-11-04 PROCEDURE — 700102 HCHG RX REV CODE 250 W/ 637 OVERRIDE(OP): Performed by: MEDICAL GENETICS

## 2017-11-04 PROCEDURE — 770002 HCHG ROOM/CARE - OB PRIVATE (112)

## 2017-11-04 RX ORDER — LABETALOL HYDROCHLORIDE 5 MG/ML
10 INJECTION, SOLUTION INTRAVENOUS ONCE
Status: COMPLETED | OUTPATIENT
Start: 2017-11-04 | End: 2017-11-04

## 2017-11-04 RX ORDER — LABETALOL HYDROCHLORIDE 5 MG/ML
INJECTION, SOLUTION INTRAVENOUS
Status: COMPLETED
Start: 2017-11-04 | End: 2017-11-04

## 2017-11-04 RX ORDER — NIFEDIPINE 60 MG/1
60 TABLET, EXTENDED RELEASE ORAL
Status: DISCONTINUED | OUTPATIENT
Start: 2017-11-04 | End: 2017-11-15 | Stop reason: HOSPADM

## 2017-11-04 RX ADMIN — FERROUS GLUCONATE 324 MG: 324 TABLET ORAL at 07:31

## 2017-11-04 RX ADMIN — NIFEDIPINE 60 MG: 60 TABLET, FILM COATED, EXTENDED RELEASE ORAL at 22:17

## 2017-11-04 RX ADMIN — LABETALOL HYDROCHLORIDE 200 MG: 100 TABLET, FILM COATED ORAL at 01:44

## 2017-11-04 RX ADMIN — DOCUSATE SODIUM 100 MG: 100 CAPSULE ORAL at 07:33

## 2017-11-04 RX ADMIN — LABETALOL HYDROCHLORIDE 200 MG: 100 TABLET, FILM COATED ORAL at 09:21

## 2017-11-04 RX ADMIN — Medication 1 TABLET: at 07:33

## 2017-11-04 RX ADMIN — LABETALOL HYDROCHLORIDE 200 MG: 100 TABLET, FILM COATED ORAL at 16:59

## 2017-11-04 RX ADMIN — LABETALOL HYDROCHLORIDE 10 MG: 5 INJECTION, SOLUTION INTRAVENOUS at 22:01

## 2017-11-04 NOTE — PROGRESS NOTES
Received report and assumed care of patient from CAMILLA Partida. Patient is resting comfortably at this time. POC reviewed, questions answered, needs met at this time.  1130 Dr Piedra bedside for BPP. BPP 8/8, PEG and dopplers WDL. Per physician ok no skip monitoring for this shift since BPP was just done.   1300 Report given to Sol and assumed care of patient.

## 2017-11-04 NOTE — PROGRESS NOTES
"1300  Report received from Cindi DONALDSON.  Pt out of room in wheelchair with sister at this time as per orders  1325  Pt back in room, no requests at this time, states \"I'm doing good\"  1900  Report given to Kathie DONALDSON  "

## 2017-11-04 NOTE — PROGRESS NOTES
Report received, PT resting, denies needs or questions at this time.    2100 PT resting, denies needs.     0144 RN at bedside for meds, PT denies needs.    0330 PT sleeping no distress noted.     0700 Report to Cindi DIMAS

## 2017-11-04 NOTE — CARE PLAN
Problem: Safety  Goal: Will remain free from injury  bELONGINGS WITHIN REACH, PT CALLS APPROPRIATELY,     Problem: Knowledge Deficit  Goal: Patient/Support Person demonstrates understanding regarding condition, prognosis and treatment needs during the pregnancy  POC discused, questions and concerns addressed.

## 2017-11-04 NOTE — PROGRESS NOTES
Patient seen and evaluated  S: sleeping  Quiet nite per RN  Good fetal movement per RN  O: AF VSS  135//88  PIH labs pending  A: 33 3/7 weeks  PIH/ renal  Anemia  IUGR  P: will plan BPP this PM

## 2017-11-04 NOTE — PROGRESS NOTES
Patient seen and evaluated    BPP 8/8  PEG 11.1  Doppler of UA normal    H/H: 9.4/31.9  Plt: 263  OT/PT: 39/21  Uric A: 6.1   A;; unchanged      Will follow/repeat

## 2017-11-05 LAB
AST SERPL-CCNC: 45 U/L (ref 12–45)
BUN SERPL-MCNC: 21 MG/DL (ref 8–22)
CREAT SERPL-MCNC: 0.77 MG/DL (ref 0.5–1.4)
ERYTHROCYTE [DISTWIDTH] IN BLOOD BY AUTOMATED COUNT: 70.5 FL (ref 35.9–50)
GFR SERPL CREATININE-BSD FRML MDRD: >60 ML/MIN/1.73 M 2
HCT VFR BLD AUTO: 29.5 % (ref 37–47)
HGB BLD-MCNC: 8.8 G/DL (ref 12–16)
MCH RBC QN AUTO: 21.9 PG (ref 27–33)
MCHC RBC AUTO-ENTMCNC: 29.8 G/DL (ref 33.6–35)
MCV RBC AUTO: 73.6 FL (ref 81.4–97.8)
PLATELET # BLD AUTO: 268 K/UL (ref 164–446)
PMV BLD AUTO: 10.5 FL (ref 9–12.9)
RBC # BLD AUTO: 4.01 M/UL (ref 4.2–5.4)
URATE SERPL-MCNC: 6.8 MG/DL (ref 1.9–8.2)
WBC # BLD AUTO: 11.5 K/UL (ref 4.8–10.8)

## 2017-11-05 PROCEDURE — A9270 NON-COVERED ITEM OR SERVICE: HCPCS | Performed by: OBSTETRICS & GYNECOLOGY

## 2017-11-05 PROCEDURE — 85027 COMPLETE CBC AUTOMATED: CPT

## 2017-11-05 PROCEDURE — 84550 ASSAY OF BLOOD/URIC ACID: CPT

## 2017-11-05 PROCEDURE — 770002 HCHG ROOM/CARE - OB PRIVATE (112)

## 2017-11-05 PROCEDURE — A9270 NON-COVERED ITEM OR SERVICE: HCPCS | Performed by: MEDICAL GENETICS

## 2017-11-05 PROCEDURE — 302790 HCHG STAT ANTEPARTUM CARE, DAILY

## 2017-11-05 PROCEDURE — 84450 TRANSFERASE (AST) (SGOT): CPT

## 2017-11-05 PROCEDURE — 700111 HCHG RX REV CODE 636 W/ 250 OVERRIDE (IP): Performed by: MEDICAL GENETICS

## 2017-11-05 PROCEDURE — 36415 COLL VENOUS BLD VENIPUNCTURE: CPT

## 2017-11-05 PROCEDURE — 84520 ASSAY OF UREA NITROGEN: CPT

## 2017-11-05 PROCEDURE — 700112 HCHG RX REV CODE 229: Performed by: OBSTETRICS & GYNECOLOGY

## 2017-11-05 PROCEDURE — 700102 HCHG RX REV CODE 250 W/ 637 OVERRIDE(OP): Performed by: OBSTETRICS & GYNECOLOGY

## 2017-11-05 PROCEDURE — 700101 HCHG RX REV CODE 250: Performed by: MEDICAL GENETICS

## 2017-11-05 PROCEDURE — 82565 ASSAY OF CREATININE: CPT

## 2017-11-05 PROCEDURE — 700102 HCHG RX REV CODE 250 W/ 637 OVERRIDE(OP): Performed by: MEDICAL GENETICS

## 2017-11-05 RX ORDER — ONDANSETRON 2 MG/ML
4 INJECTION INTRAMUSCULAR; INTRAVENOUS EVERY 4 HOURS PRN
Status: DISCONTINUED | OUTPATIENT
Start: 2017-11-05 | End: 2017-11-15 | Stop reason: HOSPADM

## 2017-11-05 RX ORDER — ACETAMINOPHEN 325 MG/1
325 TABLET ORAL EVERY 4 HOURS PRN
Status: DISCONTINUED | OUTPATIENT
Start: 2017-11-05 | End: 2017-11-08

## 2017-11-05 RX ORDER — LABETALOL 100 MG/1
300 TABLET, FILM COATED ORAL EVERY 8 HOURS
Status: DISCONTINUED | OUTPATIENT
Start: 2017-11-05 | End: 2017-11-09

## 2017-11-05 RX ORDER — LABETALOL HYDROCHLORIDE 5 MG/ML
10 INJECTION, SOLUTION INTRAVENOUS ONCE
Status: COMPLETED | OUTPATIENT
Start: 2017-11-05 | End: 2017-11-05

## 2017-11-05 RX ORDER — ONDANSETRON 4 MG/1
4 TABLET, ORALLY DISINTEGRATING ORAL EVERY 4 HOURS PRN
Status: DISCONTINUED | OUTPATIENT
Start: 2017-11-05 | End: 2017-11-15 | Stop reason: HOSPADM

## 2017-11-05 RX ORDER — OXYCODONE HYDROCHLORIDE AND ACETAMINOPHEN 5; 325 MG/1; MG/1
1-2 TABLET ORAL EVERY 4 HOURS PRN
Status: DISCONTINUED | OUTPATIENT
Start: 2017-11-05 | End: 2017-11-08

## 2017-11-05 RX ADMIN — LABETALOL HCL 300 MG: 300 TABLET, FILM COATED ORAL at 08:56

## 2017-11-05 RX ADMIN — OXYCODONE HYDROCHLORIDE AND ACETAMINOPHEN 1 TABLET: 5; 325 TABLET ORAL at 08:56

## 2017-11-05 RX ADMIN — OXYCODONE HYDROCHLORIDE AND ACETAMINOPHEN 1 TABLET: 5; 325 TABLET ORAL at 16:12

## 2017-11-05 RX ADMIN — DOCUSATE SODIUM 100 MG: 100 CAPSULE ORAL at 07:46

## 2017-11-05 RX ADMIN — LABETALOL HYDROCHLORIDE 10 MG: 5 INJECTION, SOLUTION INTRAVENOUS at 01:58

## 2017-11-05 RX ADMIN — ONDANSETRON 4 MG: 2 INJECTION INTRAMUSCULAR; INTRAVENOUS at 16:12

## 2017-11-05 RX ADMIN — Medication 1 TABLET: at 07:46

## 2017-11-05 RX ADMIN — LABETALOL HYDROCHLORIDE 200 MG: 100 TABLET, FILM COATED ORAL at 00:24

## 2017-11-05 RX ADMIN — ACETAMINOPHEN 325 MG: 325 TABLET, FILM COATED ORAL at 13:35

## 2017-11-05 RX ADMIN — LABETALOL HCL 300 MG: 300 TABLET, FILM COATED ORAL at 17:32

## 2017-11-05 RX ADMIN — FERROUS GLUCONATE 324 MG: 324 TABLET ORAL at 07:46

## 2017-11-05 RX ADMIN — LABETALOL HYDROCHLORIDE 10 MG: 5 INJECTION, SOLUTION INTRAVENOUS at 01:00

## 2017-11-05 ASSESSMENT — PAIN SCALES - GENERAL
PAINLEVEL_OUTOF10: 4
PAINLEVEL_OUTOF10: 4

## 2017-11-05 NOTE — PROGRESS NOTES
Patient seen and evaluated  S: mild/mod headache this AM  Slept poorly   Good fetal movement  O: 128//89  NST reactive last pm  h/h: 8.8/29.5  Plt: 268  OT:45  UricA: 6.8  A: 33 4/7 weeks  PIH/renal  Anemia  IUGR  P: cpm

## 2017-11-05 NOTE — PROGRESS NOTES
1900 Report received.    2115 at bedside for assessment and VS, BP elevated, will begin serial BP's.    2140 Call to Dorothea regarding increased BP, order for Procardia per MAR and AM PIH labs.    2156 Call to MD for continued high BP, per MD order for one ashwini IV labetalol.    2201 At bedside to push IV BP meds per MAR.    2217 meds per MAR, EFM and TOCO applied.     2333 monitors removed. PT denies additional needs, continue serial BP'S    0045 Call to MD regarding 2 elevated BP's, order for 10mf IV labetalol push.    0150 BP's still elevated, Dorothea notified, order to change PO labetalol per MAR and one dose of 10mg IV labetalol.     0230 PT sleeping, no distress noted.    0430 Call from Dorothea, continue POC.

## 2017-11-05 NOTE — CARE PLAN
Problem: Safety  Goal: Will remain free from injury  Belongings within reach, PT call appropriately.       Problem: Knowledge Deficit  Goal: Knowledge of disease process/condition, treatment plan, diagnostic tests, and medications will improve  POC discussed, jacqueline medication added for elevated BP's, education given.

## 2017-11-05 NOTE — PROGRESS NOTES
Received report and assumed care of patient from CAMILLA Partida. Patient is resting comfortably at this time. POC reviewed, questions answered, needs met at this time.   0845 Dr. Piedra bedside to speak with patient. BP's and strip from night shift reviewed, orders to still give increased dose of labetalol. Patient is stating that she has a bad headache. Orders for percocet and tylenol given.  1240 Physician reviewed tracing, ok to take patient off the monitor.  1900 Report given to BAL Allen and assumed care of patient.

## 2017-11-06 PROCEDURE — 700102 HCHG RX REV CODE 250 W/ 637 OVERRIDE(OP): Performed by: MEDICAL GENETICS

## 2017-11-06 PROCEDURE — 700112 HCHG RX REV CODE 229: Performed by: OBSTETRICS & GYNECOLOGY

## 2017-11-06 PROCEDURE — 59025 FETAL NON-STRESS TEST: CPT | Performed by: OBSTETRICS & GYNECOLOGY

## 2017-11-06 PROCEDURE — 700102 HCHG RX REV CODE 250 W/ 637 OVERRIDE(OP): Performed by: OBSTETRICS & GYNECOLOGY

## 2017-11-06 PROCEDURE — A9270 NON-COVERED ITEM OR SERVICE: HCPCS | Performed by: MEDICAL GENETICS

## 2017-11-06 PROCEDURE — A9270 NON-COVERED ITEM OR SERVICE: HCPCS | Performed by: OBSTETRICS & GYNECOLOGY

## 2017-11-06 PROCEDURE — 59025 FETAL NON-STRESS TEST: CPT | Performed by: MEDICAL GENETICS

## 2017-11-06 PROCEDURE — 302790 HCHG STAT ANTEPARTUM CARE, DAILY

## 2017-11-06 PROCEDURE — 770002 HCHG ROOM/CARE - OB PRIVATE (112)

## 2017-11-06 RX ADMIN — LABETALOL HCL 300 MG: 300 TABLET, FILM COATED ORAL at 08:49

## 2017-11-06 RX ADMIN — LABETALOL HCL 300 MG: 300 TABLET, FILM COATED ORAL at 17:04

## 2017-11-06 RX ADMIN — Medication 1 TABLET: at 07:53

## 2017-11-06 RX ADMIN — FERROUS GLUCONATE 324 MG: 324 TABLET ORAL at 07:51

## 2017-11-06 RX ADMIN — DOCUSATE SODIUM 100 MG: 100 CAPSULE ORAL at 07:53

## 2017-11-06 RX ADMIN — LABETALOL HCL 300 MG: 300 TABLET, FILM COATED ORAL at 01:05

## 2017-11-06 ASSESSMENT — PAIN SCALES - GENERAL
PAINLEVEL_OUTOF10: 0

## 2017-11-06 ASSESSMENT — COPD QUESTIONNAIRES
DO YOU EVER COUGH UP ANY MUCUS OR PHLEGM?: NO/ONLY WITH OCCASIONAL COLDS OR INFECTIONS
COPD SCREENING SCORE: 0
DURING THE PAST 4 WEEKS HOW MUCH DID YOU FEEL SHORT OF BREATH: NONE/LITTLE OF THE TIME
HAVE YOU SMOKED AT LEAST 100 CIGARETTES IN YOUR ENTIRE LIFE: NO/DON'T KNOW

## 2017-11-06 ASSESSMENT — LIFESTYLE VARIABLES: DO YOU DRINK ALCOHOL: NO

## 2017-11-06 NOTE — PROGRESS NOTES
Patient seen and evaluated  S: resolved headache this AM  Slept well  Good fetal movement  O: 144//62  NST reactive last pm  Labs pending  A: 33 5/7 weeks  PIH/renal  Anemia  IUGR  P: cpm

## 2017-11-06 NOTE — PROGRESS NOTES
1900- Received report from IRMA Burk RN. Patient states headache is getting better and will notify RN when she needs pain medication. Denies any lof. VB or UCs. Reports +FM. Patient has no needs at this time.     2200- Patient states headache has resolved. No need for medications at this time.     0500- Patient sleeping. No needs at this time.     0700- Report to HARSHA Zamudio RN. POC discussed.

## 2017-11-06 NOTE — PROGRESS NOTES
0700 received report from BAL Allen RN, assumed care.  0753 Rn at bedside to admin meds with breakfast tray as requested by pt.   0848 RN at bedside, assessment complete, VSS, AM meds admin per MAR. Pt on EFM and TOCO for daily NST. Pt reports positive fetal movement, denies feeling any contractions or leaking of vaginal fluid or blood. Pt denies needs, is encouraged to call RN for any needs, wants, desires or concerns.  1018 Dr. Piedra phoned and updated on pt status/FHR tracing. Strip reviewed remotely. Orders received to hold nifedipine at this time, if pt's BP increases, will reevaluate administering medication if indicated.  1100 Dr Piedra reviewed strip, orders received to go ahead and take pt off monitors, he has been performing BPP's daily and will continue as needed. Pt off monitors as ordered. Will continue with POC. Pt denies needs at this time, encouraged to call RN prn.   1645 Dr Piedra at bedside to perform BPP, 8/8 obtained. See MD note. No new orders received, MD aware of BP through the day. No new orders, will continue with POC.  1700 RN at bedside to admin med-see MAR. Pt denies needs at this time, encouraged to call RN prn  1900 report given to NOC RN, assumed care, POC discussed.

## 2017-11-07 LAB
A1 MICROGLOB PLACENTAL VAG QL: POSITIVE
ALBUMIN SERPL BCP-MCNC: 2.7 G/DL (ref 3.2–4.9)
ALBUMIN/GLOB SERPL: 0.9 G/DL
ALP SERPL-CCNC: 176 U/L (ref 30–99)
ALT SERPL-CCNC: 30 U/L (ref 2–50)
ANION GAP SERPL CALC-SCNC: 8 MMOL/L (ref 0–11.9)
AST SERPL-CCNC: 39 U/L (ref 12–45)
BASOPHILS # BLD AUTO: 0.9 % (ref 0–1.8)
BASOPHILS # BLD: 0.09 K/UL (ref 0–0.12)
BILIRUB SERPL-MCNC: 0.3 MG/DL (ref 0.1–1.5)
BUN SERPL-MCNC: 21 MG/DL (ref 8–22)
CALCIUM SERPL-MCNC: 8.6 MG/DL (ref 8.5–10.5)
CHLORIDE SERPL-SCNC: 105 MMOL/L (ref 96–112)
CO2 SERPL-SCNC: 19 MMOL/L (ref 20–33)
CREAT SERPL-MCNC: 0.93 MG/DL (ref 0.5–1.4)
EOSINOPHIL # BLD AUTO: 0.17 K/UL (ref 0–0.51)
EOSINOPHIL NFR BLD: 1.7 % (ref 0–6.9)
ERYTHROCYTE [DISTWIDTH] IN BLOOD BY AUTOMATED COUNT: 73 FL (ref 35.9–50)
GFR SERPL CREATININE-BSD FRML MDRD: >60 ML/MIN/1.73 M 2
GLOBULIN SER CALC-MCNC: 3.1 G/DL (ref 1.9–3.5)
GLUCOSE SERPL-MCNC: 93 MG/DL (ref 65–99)
HCT VFR BLD AUTO: 30.6 % (ref 37–47)
HGB BLD-MCNC: 9.1 G/DL (ref 12–16)
IMM GRANULOCYTES # BLD AUTO: 0.1 K/UL (ref 0–0.11)
IMM GRANULOCYTES NFR BLD AUTO: 1 % (ref 0–0.9)
LYMPHOCYTES # BLD AUTO: 3.45 K/UL (ref 1–4.8)
LYMPHOCYTES NFR BLD: 33.5 % (ref 22–41)
MCH RBC QN AUTO: 22.1 PG (ref 27–33)
MCHC RBC AUTO-ENTMCNC: 29.7 G/DL (ref 33.6–35)
MCV RBC AUTO: 74.5 FL (ref 81.4–97.8)
MONOCYTES # BLD AUTO: 0.48 K/UL (ref 0–0.85)
MONOCYTES NFR BLD AUTO: 4.7 % (ref 0–13.4)
NEUTROPHILS # BLD AUTO: 6 K/UL (ref 2–7.15)
NEUTROPHILS NFR BLD: 58.2 % (ref 44–72)
NRBC # BLD AUTO: 0.02 K/UL
NRBC BLD AUTO-RTO: 0.2 /100 WBC
PLATELET # BLD AUTO: 254 K/UL (ref 164–446)
PMV BLD AUTO: 10.2 FL (ref 9–12.9)
POTASSIUM SERPL-SCNC: 3.8 MMOL/L (ref 3.6–5.5)
PROT SERPL-MCNC: 5.8 G/DL (ref 6–8.2)
RBC # BLD AUTO: 4.11 M/UL (ref 4.2–5.4)
SODIUM SERPL-SCNC: 132 MMOL/L (ref 135–145)
URATE SERPL-MCNC: 5.9 MG/DL (ref 1.9–8.2)
WBC # BLD AUTO: 10.3 K/UL (ref 4.8–10.8)

## 2017-11-07 PROCEDURE — 86900 BLOOD TYPING SEROLOGIC ABO: CPT

## 2017-11-07 PROCEDURE — 84112 EVAL AMNIOTIC FLUID PROTEIN: CPT

## 2017-11-07 PROCEDURE — 770002 HCHG ROOM/CARE - OB PRIVATE (112)

## 2017-11-07 PROCEDURE — 83735 ASSAY OF MAGNESIUM: CPT

## 2017-11-07 PROCEDURE — 80053 COMPREHEN METABOLIC PANEL: CPT

## 2017-11-07 PROCEDURE — 86901 BLOOD TYPING SEROLOGIC RH(D): CPT

## 2017-11-07 PROCEDURE — 700112 HCHG RX REV CODE 229: Performed by: OBSTETRICS & GYNECOLOGY

## 2017-11-07 PROCEDURE — 59025 FETAL NON-STRESS TEST: CPT | Performed by: MEDICAL GENETICS

## 2017-11-07 PROCEDURE — 700102 HCHG RX REV CODE 250 W/ 637 OVERRIDE(OP): Performed by: MEDICAL GENETICS

## 2017-11-07 PROCEDURE — 85025 COMPLETE CBC W/AUTO DIFF WBC: CPT

## 2017-11-07 PROCEDURE — 86850 RBC ANTIBODY SCREEN: CPT

## 2017-11-07 PROCEDURE — 700102 HCHG RX REV CODE 250 W/ 637 OVERRIDE(OP): Performed by: OBSTETRICS & GYNECOLOGY

## 2017-11-07 PROCEDURE — A9270 NON-COVERED ITEM OR SERVICE: HCPCS | Performed by: OBSTETRICS & GYNECOLOGY

## 2017-11-07 PROCEDURE — A9270 NON-COVERED ITEM OR SERVICE: HCPCS | Performed by: MEDICAL GENETICS

## 2017-11-07 PROCEDURE — 36415 COLL VENOUS BLD VENIPUNCTURE: CPT

## 2017-11-07 PROCEDURE — 84550 ASSAY OF BLOOD/URIC ACID: CPT

## 2017-11-07 RX ORDER — PENICILLIN G POTASSIUM 5000000 [IU]/1
5 INJECTION, POWDER, FOR SOLUTION INTRAMUSCULAR; INTRAVENOUS ONCE
Status: COMPLETED | OUTPATIENT
Start: 2017-11-07 | End: 2017-11-08

## 2017-11-07 RX ORDER — NIFEDIPINE 10 MG/1
10 CAPSULE ORAL ONCE
Status: COMPLETED | OUTPATIENT
Start: 2017-11-08 | End: 2017-11-08

## 2017-11-07 RX ORDER — MAGNESIUM SULFATE HEPTAHYDRATE 40 MG/ML
4 INJECTION, SOLUTION INTRAVENOUS ONCE
Status: COMPLETED | OUTPATIENT
Start: 2017-11-07 | End: 2017-11-08

## 2017-11-07 RX ADMIN — LABETALOL HCL 300 MG: 300 TABLET, FILM COATED ORAL at 01:00

## 2017-11-07 RX ADMIN — Medication 1 TABLET: at 08:09

## 2017-11-07 RX ADMIN — LABETALOL HCL 300 MG: 300 TABLET, FILM COATED ORAL at 17:12

## 2017-11-07 RX ADMIN — DOCUSATE SODIUM 100 MG: 100 CAPSULE ORAL at 08:09

## 2017-11-07 RX ADMIN — LABETALOL HCL 300 MG: 300 TABLET, FILM COATED ORAL at 08:46

## 2017-11-07 RX ADMIN — FERROUS GLUCONATE 324 MG: 324 TABLET ORAL at 08:09

## 2017-11-07 ASSESSMENT — PAIN SCALES - GENERAL
PAINLEVEL_OUTOF10: 0

## 2017-11-07 ASSESSMENT — PATIENT HEALTH QUESTIONNAIRE - PHQ9
2. FEELING DOWN, DEPRESSED, IRRITABLE, OR HOPELESS: NOT AT ALL
SUM OF ALL RESPONSES TO PHQ QUESTIONS 1-9: 0
SUM OF ALL RESPONSES TO PHQ9 QUESTIONS 1 AND 2: 0
1. LITTLE INTEREST OR PLEASURE IN DOING THINGS: NOT AT ALL

## 2017-11-07 ASSESSMENT — COPD QUESTIONNAIRES
COPD SCREENING SCORE: 0
HAVE YOU SMOKED AT LEAST 100 CIGARETTES IN YOUR ENTIRE LIFE: NO/DON'T KNOW
DO YOU EVER COUGH UP ANY MUCUS OR PHLEGM?: NO/ONLY WITH OCCASIONAL COLDS OR INFECTIONS
DURING THE PAST 4 WEEKS HOW MUCH DID YOU FEEL SHORT OF BREATH: NONE/LITTLE OF THE TIME

## 2017-11-07 ASSESSMENT — LIFESTYLE VARIABLES: DO YOU DRINK ALCOHOL: NO

## 2017-11-07 NOTE — PROGRESS NOTES
PEG/doppler study    SIUP vertex  Doppler of UA: S/D: 4.64 (slightly increased from prior)  PEG normal: 11.9 (unchanged  BPP 8/8    Will follow

## 2017-11-07 NOTE — CARE PLAN
Problem: Psychosocial needs  Goal: Anxiety reduction    Intervention: Stimuli reduction, calming techniques  Lights dimmed to reduce stimuli and promote a calm atmosphere.      Problem: Fatigue  Goal: Patient will use techniques to conserve energy    Intervention: Plan care to limit interruptions  Care clustered to limit interruptions to promote rest and combat fatigue.

## 2017-11-07 NOTE — PROGRESS NOTES
1900- Report received from HARSHA Zamudio RN.  Pt denies LOF, VB, or UC's. Pt reports +FM.  Pt denies HA, blurred vision, or epigastric pain.  1922- US and Riddle on for NST.  0000- pt sleeping.  0620- Dr. Piedra updated on VD with broken tracing as pt was sitting up during monitoring, no new orders given.   0700- Report given to HARSHA Zamudio RN.

## 2017-11-07 NOTE — PROGRESS NOTES
0700 received report from BAL Toledo RN, assumed care.  0809 Rn at bedside to admin meds with breakfast tray as requested by pt.   0809 RN at bedside, morning meds admin, pt eating breakfast, denies needs, report positive fetal moment, denies contraction or leaking of vaginal fluid or blood.   0846 RN at bedside, assessment complete, VSS, AM meds admin per MAR. Pt on EFM and TOCO for daily NST. Pt reports positive fetal movement, denies feeling any contractions or leaking of vaginal fluid or blood. Pt denies needs, is encouraged to call RN for any needs, wants, desires or concerns.  1120 Dr. Piedra phoned and updated on pt status/FHR tracing/status update, strip reviewed, order received to allow pt off monitors. Pt encouraged to call RN prn.   1712 RN at bedside to admin labetalol. Pt eating dinner, denies needs, encouraged to call Rn prn.   1900 report given to NOC RN, assumed care, POC discussed, all questions answered. VSS.

## 2017-11-07 NOTE — PROGRESS NOTES
Patient seen and evaluated  S: Sleeping  resolved headache this AM per RN  Slept well  Good fetal movement  O: 132//91  NST reactive last pm (one short variable)  Labs pending  A: 33 6/7 weeks  PIH/renal  Anemia  IUGR  P: cpm

## 2017-11-08 LAB
ABO GROUP BLD: NORMAL
BLD GP AB SCN SERPL QL: NORMAL
ERYTHROCYTE [DISTWIDTH] IN BLOOD BY AUTOMATED COUNT: 70.3 FL (ref 35.9–50)
HCT VFR BLD AUTO: 31.8 % (ref 37–47)
HGB BLD-MCNC: 9.7 G/DL (ref 12–16)
MAGNESIUM SERPL-MCNC: 1.7 MG/DL (ref 1.5–2.5)
MAGNESIUM SERPL-MCNC: 6.1 MG/DL (ref 1.5–2.5)
MCH RBC QN AUTO: 22.2 PG (ref 27–33)
MCHC RBC AUTO-ENTMCNC: 30.5 G/DL (ref 33.6–35)
MCV RBC AUTO: 72.9 FL (ref 81.4–97.8)
PLATELET # BLD AUTO: 220 K/UL (ref 164–446)
PMV BLD AUTO: 9.4 FL (ref 9–12.9)
RBC # BLD AUTO: 4.36 M/UL (ref 4.2–5.4)
RH BLD: NORMAL
WBC # BLD AUTO: 18.5 K/UL (ref 4.8–10.8)

## 2017-11-08 PROCEDURE — 700102 HCHG RX REV CODE 250 W/ 637 OVERRIDE(OP): Performed by: MEDICAL GENETICS

## 2017-11-08 PROCEDURE — 83735 ASSAY OF MAGNESIUM: CPT

## 2017-11-08 PROCEDURE — 700111 HCHG RX REV CODE 636 W/ 250 OVERRIDE (IP)

## 2017-11-08 PROCEDURE — 700112 HCHG RX REV CODE 229: Performed by: OBSTETRICS & GYNECOLOGY

## 2017-11-08 PROCEDURE — 88307 TISSUE EXAM BY PATHOLOGIST: CPT

## 2017-11-08 PROCEDURE — A9270 NON-COVERED ITEM OR SERVICE: HCPCS | Performed by: MEDICAL GENETICS

## 2017-11-08 PROCEDURE — 59409 OBSTETRICAL CARE: CPT

## 2017-11-08 PROCEDURE — 700111 HCHG RX REV CODE 636 W/ 250 OVERRIDE (IP): Performed by: OBSTETRICS & GYNECOLOGY

## 2017-11-08 PROCEDURE — A9270 NON-COVERED ITEM OR SERVICE: HCPCS | Performed by: OBSTETRICS & GYNECOLOGY

## 2017-11-08 PROCEDURE — 302128 INFUSION PUMP: Performed by: OBSTETRICS & GYNECOLOGY

## 2017-11-08 PROCEDURE — 700105 HCHG RX REV CODE 258

## 2017-11-08 PROCEDURE — 700102 HCHG RX REV CODE 250 W/ 637 OVERRIDE(OP): Performed by: OBSTETRICS & GYNECOLOGY

## 2017-11-08 PROCEDURE — 36415 COLL VENOUS BLD VENIPUNCTURE: CPT

## 2017-11-08 PROCEDURE — 700111 HCHG RX REV CODE 636 W/ 250 OVERRIDE (IP): Performed by: MEDICAL GENETICS

## 2017-11-08 PROCEDURE — 700105 HCHG RX REV CODE 258: Performed by: OBSTETRICS & GYNECOLOGY

## 2017-11-08 PROCEDURE — 85027 COMPLETE CBC AUTOMATED: CPT

## 2017-11-08 PROCEDURE — 770002 HCHG ROOM/CARE - OB PRIVATE (112)

## 2017-11-08 PROCEDURE — 304965 HCHG RECOVERY SERVICES

## 2017-11-08 RX ORDER — BISACODYL 10 MG
10 SUPPOSITORY, RECTAL RECTAL PRN
Status: DISCONTINUED | OUTPATIENT
Start: 2017-11-08 | End: 2017-11-15 | Stop reason: HOSPADM

## 2017-11-08 RX ORDER — DOCUSATE SODIUM 100 MG/1
100 CAPSULE, LIQUID FILLED ORAL 2 TIMES DAILY PRN
Status: DISCONTINUED | OUTPATIENT
Start: 2017-11-08 | End: 2017-11-15 | Stop reason: HOSPADM

## 2017-11-08 RX ORDER — OXYCODONE HYDROCHLORIDE AND ACETAMINOPHEN 5; 325 MG/1; MG/1
1 TABLET ORAL EVERY 6 HOURS PRN
Status: DISCONTINUED | OUTPATIENT
Start: 2017-11-08 | End: 2017-11-15 | Stop reason: HOSPADM

## 2017-11-08 RX ORDER — VITAMIN A ACETATE, BETA CAROTENE, ASCORBIC ACID, CHOLECALCIFEROL, .ALPHA.-TOCOPHEROL ACETATE, DL-, THIAMINE MONONITRATE, RIBOFLAVIN, NIACINAMIDE, PYRIDOXINE HYDROCHLORIDE, FOLIC ACID, CYANOCOBALAMIN, CALCIUM CARBONATE, FERROUS FUMARATE, ZINC OXIDE, CUPRIC OXIDE 3080; 12; 120; 400; 1; 1.84; 3; 20; 22; 920; 25; 200; 27; 10; 2 [IU]/1; UG/1; MG/1; [IU]/1; MG/1; MG/1; MG/1; MG/1; MG/1; [IU]/1; MG/1; MG/1; MG/1; MG/1; MG/1
1 TABLET, FILM COATED ORAL EVERY MORNING
Status: DISCONTINUED | OUTPATIENT
Start: 2017-11-08 | End: 2017-11-15 | Stop reason: HOSPADM

## 2017-11-08 RX ORDER — SODIUM CHLORIDE, SODIUM LACTATE, POTASSIUM CHLORIDE, CALCIUM CHLORIDE 600; 310; 30; 20 MG/100ML; MG/100ML; MG/100ML; MG/100ML
INJECTION, SOLUTION INTRAVENOUS CONTINUOUS
Status: DISCONTINUED | OUTPATIENT
Start: 2017-11-08 | End: 2017-11-08 | Stop reason: HOSPADM

## 2017-11-08 RX ORDER — SODIUM CHLORIDE, SODIUM LACTATE, POTASSIUM CHLORIDE, CALCIUM CHLORIDE 600; 310; 30; 20 MG/100ML; MG/100ML; MG/100ML; MG/100ML
INJECTION, SOLUTION INTRAVENOUS PRN
Status: DISCONTINUED | OUTPATIENT
Start: 2017-11-08 | End: 2017-11-15 | Stop reason: HOSPADM

## 2017-11-08 RX ORDER — SODIUM CHLORIDE 9 MG/ML
INJECTION, SOLUTION INTRAVENOUS
Status: COMPLETED
Start: 2017-11-08 | End: 2017-11-08

## 2017-11-08 RX ORDER — NIFEDIPINE 10 MG/1
10 CAPSULE ORAL ONCE
Status: COMPLETED | OUTPATIENT
Start: 2017-11-08 | End: 2017-11-08

## 2017-11-08 RX ORDER — CARBOPROST TROMETHAMINE 250 UG/ML
250 INJECTION, SOLUTION INTRAMUSCULAR
Status: DISCONTINUED | OUTPATIENT
Start: 2017-11-08 | End: 2017-11-15 | Stop reason: HOSPADM

## 2017-11-08 RX ORDER — ACETAMINOPHEN 500 MG
500 TABLET ORAL EVERY 6 HOURS PRN
Status: DISCONTINUED | OUTPATIENT
Start: 2017-11-08 | End: 2017-11-15 | Stop reason: HOSPADM

## 2017-11-08 RX ORDER — MAGNESIUM SULFATE HEPTAHYDRATE 40 MG/ML
2 INJECTION, SOLUTION INTRAVENOUS CONTINUOUS
Status: DISCONTINUED | OUTPATIENT
Start: 2017-11-08 | End: 2017-11-10

## 2017-11-08 RX ADMIN — SODIUM CHLORIDE 100 ML: 9 INJECTION, SOLUTION INTRAVENOUS at 00:21

## 2017-11-08 RX ADMIN — MAGNESIUM SULFATE IN WATER 2 G/HR: 40 INJECTION, SOLUTION INTRAVENOUS at 00:48

## 2017-11-08 RX ADMIN — NIFEDIPINE 10 MG: 10 CAPSULE, LIQUID FILLED ORAL at 03:22

## 2017-11-08 RX ADMIN — LABETALOL HCL 300 MG: 300 TABLET, FILM COATED ORAL at 01:19

## 2017-11-08 RX ADMIN — MAGNESIUM SULFATE IN WATER 4 G: 40 INJECTION, SOLUTION INTRAVENOUS at 00:23

## 2017-11-08 RX ADMIN — OXYTOCIN 75 ML/HR: 10 INJECTION, SOLUTION INTRAMUSCULAR; INTRAVENOUS at 04:45

## 2017-11-08 RX ADMIN — MAGNESIUM SULFATE IN WATER 2 G/HR: 40 INJECTION, SOLUTION INTRAVENOUS at 10:03

## 2017-11-08 RX ADMIN — MAGNESIUM SULFATE IN WATER 2 G/HR: 40 INJECTION, SOLUTION INTRAVENOUS at 20:16

## 2017-11-08 RX ADMIN — SODIUM CHLORIDE, POTASSIUM CHLORIDE, SODIUM LACTATE AND CALCIUM CHLORIDE: 600; 310; 30; 20 INJECTION, SOLUTION INTRAVENOUS at 17:25

## 2017-11-08 RX ADMIN — FERROUS GLUCONATE 324 MG: 324 TABLET ORAL at 09:42

## 2017-11-08 RX ADMIN — LABETALOL HCL 300 MG: 300 TABLET, FILM COATED ORAL at 09:42

## 2017-11-08 RX ADMIN — PENICILLIN G POTASSIUM 5 MILLION UNITS: 5000000 POWDER, FOR SOLUTION INTRAMUSCULAR; INTRAPLEURAL; INTRATHECAL; INTRAVENOUS at 00:20

## 2017-11-08 RX ADMIN — ACETAMINOPHEN 500 MG: 500 TABLET ORAL at 17:05

## 2017-11-08 RX ADMIN — NIFEDIPINE 60 MG: 60 TABLET, FILM COATED, EXTENDED RELEASE ORAL at 09:42

## 2017-11-08 RX ADMIN — NIFEDIPINE 10 MG: 10 CAPSULE, LIQUID FILLED ORAL at 00:04

## 2017-11-08 RX ADMIN — SODIUM CHLORIDE, POTASSIUM CHLORIDE, SODIUM LACTATE AND CALCIUM CHLORIDE: 600; 310; 30; 20 INJECTION, SOLUTION INTRAVENOUS at 00:22

## 2017-11-08 RX ADMIN — LABETALOL HCL 300 MG: 300 TABLET, FILM COATED ORAL at 17:05

## 2017-11-08 RX ADMIN — OXYTOCIN 1000 ML: 10 INJECTION, SOLUTION INTRAMUSCULAR; INTRAVENOUS at 02:00

## 2017-11-08 RX ADMIN — DOCUSATE SODIUM 100 MG: 100 CAPSULE ORAL at 09:42

## 2017-11-08 RX ADMIN — ONDANSETRON 4 MG: 2 INJECTION INTRAMUSCULAR; INTRAVENOUS at 22:44

## 2017-11-08 RX ADMIN — ONDANSETRON 4 MG: 2 INJECTION INTRAMUSCULAR; INTRAVENOUS at 00:46

## 2017-11-08 RX ADMIN — ACETAMINOPHEN 500 MG: 500 TABLET ORAL at 05:53

## 2017-11-08 RX ADMIN — Medication 1 TABLET: at 09:42

## 2017-11-08 ASSESSMENT — PAIN SCALES - GENERAL
PAINLEVEL_OUTOF10: 2
PAINLEVEL_OUTOF10: 2
PAINLEVEL_OUTOF10: 4
PAINLEVEL_OUTOF10: 0

## 2017-11-08 NOTE — PROGRESS NOTES
32 y.o. , EDC =33w6d, preeclampsia/IUGR. Pt adopting out to sister in [place]    - Report received from Cassandra REYNOSO RN, pt reports some cramping, TOCO starting to  ctx on monitors. Pt having variable decelerations, + amnisure result, call to Dr. Kathleen, orders to start magnesium and abx per MAR    2346- Pt moved to S212    131- Pt feeling some pressure, SVE 5-6, call to Dr. Kathleen, will be here in half hour    0150- SVE complete/+2, to OR for delivery, Dr. Martinez called for standby     0156-  viable female by Dr. Martinez, apgars 7/7, NICU and RT present for delivery, to NICU. Pt back to S212, magnesium to continue for 24 hours    0315- Spoke with Dr. Kathleen in unit, most recent /107, one time order for PO nifedipine    0330- Dr. Blanchard NICU MD at bedside     0520- Call from Tania in lab, magnesium level 20.6, lab requested to bedside to redraw sample    0620- BP stable, pt transferred to PPU via  after +void, bedside report given to MARLIN Parish

## 2017-11-08 NOTE — PROGRESS NOTES
Pt arrived to S312 via wheelchair with L&D RN. Report received from Maisha. Assessment completed. Denies pain at this time. Magnesium Sulfate running at 50mL/hr, and Pitocin running at 75mL/hr. Fundus firm, lochia light. Sequentials in use. Pt oriented to room, call light, emergency light, and unit routine. Encouraged to call for assistance.

## 2017-11-08 NOTE — PROGRESS NOTES
Jumana from Lab called with critical result of Magnesium at 0630. Critical lab result read back to Jumana.   This critical lab result is within parameters established by  for this patient

## 2017-11-08 NOTE — PROGRESS NOTES
"1900- Report received from HARSHA Zamudio RN.  2155- pt called RN into . Pt states, \"I just had a gush of fluid and it's green.\" Pt has elevated BP's and pt has a bloody nose.  Dr. Kathleen notified of pt's status, orders for amnisure and labs. Monitor BP's Q15min.  2215- pt has large amounts of green tinged fluid flowing out. Pt feeling pressure, SVE 3-4/70/-2, copious amounts of green tinged fluid present.  2240- amnisure obtained.  2300- lab called with positive amnisure result.  2305- Report given to SIMÓN Barros RN.  "

## 2017-11-08 NOTE — OP REPORT
DATE OF SERVICE:  11/08/2017    DELIVERY NOTE    PREDELIVERY DIAGNOSES:  1.  Intrauterine pregnancy at 34 weeks.  2.  Preeclampsia.  3.  Rule out underlying renal disease.  4.  Anemia.  5.  Intrauterine growth restriction.    POSTDELIVERY DIAGNOSES:  1.  Intrauterine pregnancy at 34 weeks.  2.  Preeclampsia.  3.  Rule out underlying renal disease.  4.  Anemia.  5.  Intrauterine growth restriction.    PROCEDURE:  Normal spontaneous vaginal delivery.    ANESTHESIA:  None.    DESCRIPTION OF PROCEDURE:  The patient had rapid progression and achieved   complete dilatation, was actively pushing, standby by Dr. Martinez.  She had a   normal spontaneous vaginal delivery of a liveborn female infant, Apgars 7 and   7.  No perineal lacerations or tears.  Placental delivery, spontaneous intact   with 3 vessels.  Patient was already on magnesium sulfate prior to delivery.    Patient tolerated the procedure well, returned to recovery in satisfactory   condition.       ____________________________________     MD LAZ KUMAR / TRISH    DD:  11/08/2017 02:20:41  DT:  11/08/2017 02:37:48    D#:  [id number]  Job#:  [id number]

## 2017-11-08 NOTE — PROGRESS NOTES
0800-Patient alert, oriented.  Vital signs stable. Fundus firm@U, lochia light.  Patient using lyn bottle, voiding without difficulty.  Patient denies complaints of pain at this time.  Magnesium Sulfate infusing at 2 grams pr hour (50 ml/hr).  DTRS 2+ and negative clonus.  Encouraged to call with needs.  Infant in the NICU.  Patient states she would like to visit the infant later today and also start pumping for the infant, but not until later.

## 2017-11-09 PROCEDURE — A9270 NON-COVERED ITEM OR SERVICE: HCPCS | Performed by: OBSTETRICS & GYNECOLOGY

## 2017-11-09 PROCEDURE — 700102 HCHG RX REV CODE 250 W/ 637 OVERRIDE(OP): Performed by: OBSTETRICS & GYNECOLOGY

## 2017-11-09 PROCEDURE — 770002 HCHG ROOM/CARE - OB PRIVATE (112)

## 2017-11-09 PROCEDURE — 700102 HCHG RX REV CODE 250 W/ 637 OVERRIDE(OP): Performed by: MEDICAL GENETICS

## 2017-11-09 PROCEDURE — A9270 NON-COVERED ITEM OR SERVICE: HCPCS | Performed by: MEDICAL GENETICS

## 2017-11-09 PROCEDURE — 700112 HCHG RX REV CODE 229: Performed by: OBSTETRICS & GYNECOLOGY

## 2017-11-09 RX ORDER — LABETALOL 100 MG/1
100 TABLET, FILM COATED ORAL TWICE DAILY
Status: DISCONTINUED | OUTPATIENT
Start: 2017-11-09 | End: 2017-11-11

## 2017-11-09 RX ORDER — LABETALOL 100 MG/1
100 TABLET, FILM COATED ORAL EVERY 8 HOURS
Status: DISCONTINUED | OUTPATIENT
Start: 2017-11-09 | End: 2017-11-09

## 2017-11-09 RX ADMIN — ACETAMINOPHEN 500 MG: 500 TABLET ORAL at 21:23

## 2017-11-09 RX ADMIN — Medication 1 TABLET: at 08:46

## 2017-11-09 RX ADMIN — ACETAMINOPHEN 500 MG: 500 TABLET ORAL at 08:49

## 2017-11-09 RX ADMIN — LABETALOL HCL 300 MG: 300 TABLET, FILM COATED ORAL at 02:15

## 2017-11-09 RX ADMIN — LABETALOL HYDROCHLORIDE 100 MG: 100 TABLET, FILM COATED ORAL at 08:46

## 2017-11-09 RX ADMIN — DOCUSATE SODIUM 100 MG: 100 CAPSULE ORAL at 08:47

## 2017-11-09 RX ADMIN — LABETALOL HYDROCHLORIDE 100 MG: 100 TABLET, FILM COATED ORAL at 21:23

## 2017-11-09 RX ADMIN — NIFEDIPINE 60 MG: 60 TABLET, FILM COATED, EXTENDED RELEASE ORAL at 08:46

## 2017-11-09 RX ADMIN — FERROUS GLUCONATE 324 MG: 324 TABLET ORAL at 08:47

## 2017-11-09 RX ADMIN — ACETAMINOPHEN 500 MG: 500 TABLET ORAL at 02:14

## 2017-11-09 ASSESSMENT — PAIN SCALES - GENERAL
PAINLEVEL_OUTOF10: 5
PAINLEVEL_OUTOF10: 0
PAINLEVEL_OUTOF10: 5
PAINLEVEL_OUTOF10: 0
PAINLEVEL_OUTOF10: 2

## 2017-11-09 NOTE — PROGRESS NOTES
S:  Feeling well.  O: BP: 105/71 mmH       Fundus firm       Lochia rubra        Hct: 31.8       Plt: 220 K  A:  PPD 1       Resolving preeclampsia       Anemia  P: Decreased labetalol to 100 mg BID    Time: 15 minutes

## 2017-11-09 NOTE — PROGRESS NOTES
"This LC was informed that mother (who is planning on adopting baby out to a family member) would like to use breast pump, when I spoke with mother she stated she would like to pump but not until \"later\", states she may not decide to pump until after she is discharged home, discussed supply and demand aspect of milk initiation/supply, mother states understanding and agreement, mother also reports history of low milk supply though she reports leaking milk throughout this pregnancy, mother states understanding that milk may wither not come in or it may be minimal if she waits to begin pumping, assured however that we will support any decision she makes.    Mother has Medicaid, encouraged to establish care with WIC in [facility] where she lives in order to get HG WIC pump if she decides to pump after discharged to home, mother currently has hand pump only for home use.    Encouraged to call for assistance as needed.  "

## 2017-11-09 NOTE — DISCHARGE PLANNING
Medical Social Work     SW met with MOB at bedside to gather updates on discharge plan. MOB plans for a familial adoption with sister and brother-in-law. They are [place] from [place], [place]. MOB unsure of 's name handling adoption. Sister and brother-in-law plan to come visit pt and MOB today. MOB understands that at this point in time she is primary decision maker until adoption is finalized.      Plan: SW to meet with sister and brother-in-law later this afternoon.

## 2017-11-09 NOTE — CARE PLAN
Problem: Altered physiologic condition related to immediate post-delivery state and potential for bleeding/hemorrhage  Goal: Patient physiologically stable as evidenced by normal lochia, palpable uterine involution and vital signs within normal limits  Outcome: PROGRESSING AS EXPECTED  VSS. Fundus firm. Lochia light.    Problem: Alteration in comfort related to episiotomy, vaginal repair and/or after birth pains  Goal: Patient verbalizes acceptable pain level  Outcome: PROGRESSING AS EXPECTED  Pt pain managed with PRN pain meds per MAR.

## 2017-11-09 NOTE — PROGRESS NOTES
Assessment completed, VSS, Fundus firm and lochia light. C/o headache from lack of sleep, requested acetaminophen.

## 2017-11-09 NOTE — PROGRESS NOTES
2245: Pt nauseaus and vomitting. IV zofran given.  0200: Pt states she is feeling better since the zofran was given.

## 2017-11-09 NOTE — DISCHARGE PLANNING
Medical Social Work    SW received call from attending nurse who reported that MOB's brother-in-law is requesting discount letter for [employer]. SW provided MOB with discount letter to give to sister and brother-in-law.

## 2017-11-09 NOTE — PROGRESS NOTES
Assessment complete. VSS. Fundus firm, lochia light. Pain controlled with prn medications per mar. Pt will call to request pain medications. Magnesium Sulfate running at 50mL/hr, LR running at 75mLs/hr. States voiding without difficulty. POC discussed. Encouraged to call with needs. Call light in place

## 2017-11-10 LAB
ALBUMIN SERPL BCP-MCNC: 2.7 G/DL (ref 3.2–4.9)
ALBUMIN/GLOB SERPL: 0.9 G/DL
ALP SERPL-CCNC: 168 U/L (ref 30–99)
ALT SERPL-CCNC: 36 U/L (ref 2–50)
ANION GAP SERPL CALC-SCNC: 8 MMOL/L (ref 0–11.9)
AST SERPL-CCNC: 52 U/L (ref 12–45)
BILIRUB SERPL-MCNC: 0.2 MG/DL (ref 0.1–1.5)
BUN SERPL-MCNC: 16 MG/DL (ref 8–22)
CALCIUM SERPL-MCNC: 7.6 MG/DL (ref 8.5–10.5)
CHLORIDE SERPL-SCNC: 106 MMOL/L (ref 96–112)
CO2 SERPL-SCNC: 23 MMOL/L (ref 20–33)
CREAT SERPL-MCNC: 0.67 MG/DL (ref 0.5–1.4)
GFR SERPL CREATININE-BSD FRML MDRD: >60 ML/MIN/1.73 M 2
GLOBULIN SER CALC-MCNC: 3 G/DL (ref 1.9–3.5)
GLUCOSE SERPL-MCNC: 79 MG/DL (ref 65–99)
POTASSIUM SERPL-SCNC: 4.2 MMOL/L (ref 3.6–5.5)
PROT SERPL-MCNC: 5.7 G/DL (ref 6–8.2)
SODIUM SERPL-SCNC: 137 MMOL/L (ref 135–145)

## 2017-11-10 PROCEDURE — 80053 COMPREHEN METABOLIC PANEL: CPT

## 2017-11-10 PROCEDURE — 36415 COLL VENOUS BLD VENIPUNCTURE: CPT

## 2017-11-10 PROCEDURE — 700102 HCHG RX REV CODE 250 W/ 637 OVERRIDE(OP): Performed by: MEDICAL GENETICS

## 2017-11-10 PROCEDURE — 770002 HCHG ROOM/CARE - OB PRIVATE (112)

## 2017-11-10 PROCEDURE — A9270 NON-COVERED ITEM OR SERVICE: HCPCS | Performed by: OBSTETRICS & GYNECOLOGY

## 2017-11-10 PROCEDURE — A9270 NON-COVERED ITEM OR SERVICE: HCPCS | Performed by: MEDICAL GENETICS

## 2017-11-10 PROCEDURE — 700102 HCHG RX REV CODE 250 W/ 637 OVERRIDE(OP): Performed by: OBSTETRICS & GYNECOLOGY

## 2017-11-10 PROCEDURE — 700112 HCHG RX REV CODE 229: Performed by: OBSTETRICS & GYNECOLOGY

## 2017-11-10 RX ORDER — NIFEDIPINE 10 MG/1
10 CAPSULE ORAL ONCE
Status: COMPLETED | OUTPATIENT
Start: 2017-11-10 | End: 2017-11-10

## 2017-11-10 RX ORDER — FERROUS GLUCONATE 324(38)MG
324 TABLET ORAL
Status: DISCONTINUED | OUTPATIENT
Start: 2017-11-10 | End: 2017-11-15 | Stop reason: HOSPADM

## 2017-11-10 RX ADMIN — NIFEDIPINE 10 MG: 10 CAPSULE, LIQUID FILLED ORAL at 13:31

## 2017-11-10 RX ADMIN — LABETALOL HYDROCHLORIDE 100 MG: 100 TABLET, FILM COATED ORAL at 08:39

## 2017-11-10 RX ADMIN — NIFEDIPINE 60 MG: 60 TABLET, FILM COATED, EXTENDED RELEASE ORAL at 08:39

## 2017-11-10 RX ADMIN — LABETALOL HYDROCHLORIDE 100 MG: 100 TABLET, FILM COATED ORAL at 20:30

## 2017-11-10 RX ADMIN — DOCUSATE SODIUM 100 MG: 100 CAPSULE ORAL at 08:39

## 2017-11-10 RX ADMIN — FERROUS GLUCONATE 324 MG: 324 TABLET ORAL at 08:39

## 2017-11-10 RX ADMIN — Medication 1 TABLET: at 08:39

## 2017-11-10 ASSESSMENT — PAIN SCALES - GENERAL
PAINLEVEL_OUTOF10: 0
PAINLEVEL_OUTOF10: 0

## 2017-11-10 NOTE — PROGRESS NOTES
Notified MD of patient BP. Received telephone order with read back, one time order of nifedipine 10mg tablet PO. RN to recheck BP in 30 mins after administration.

## 2017-11-10 NOTE — PROGRESS NOTES
Assessment complete. VSS. Fundus firm, lochia light. No c/o pain at this time. States voiding without difficulty. POC discussed. Encouraged to call with needs. Call light in place.

## 2017-11-10 NOTE — PROGRESS NOTES
Assessment complete. VSS. Fundus firm, lochia light. Pain controlled with prn medications per mar. Pt will call to request pain medications. States voiding without difficulty. POC discussed. Encouraged to call with needs. Call light in place

## 2017-11-10 NOTE — DISCHARGE PLANNING
:     Attended care conference with Cassidy Blas-RN, URMILA, MOB's sister and brother-in-law/adoptive parents: Brandon and Hima Nancysukhwinder.  URMILA stated once she is discharged she will be living with Brandon and Hima.  Verified address and phone number which is [address] [place] [id number].  URMILA's cell is [phone], Brandon's cell is [phone], and Hima's cell is [phone].  Hima and Brandon are still working on finding an  in [facility].  At this time URMILA has the only band and so they all plan on visiting infant together.  SW asked Hima and Brandon to bring in their ID's during the next visit and so a copy could be made to be put in infant's chart.   Provided Hima and Brandon with a list of pediatricians and a children's resource list.  A referral to the [facility] was made for all three of them to stay.  Directions and information were given to family.  This will be Hima mj Brandon's first baby.       Continue to follow and assist as needed.

## 2017-11-10 NOTE — CARE PLAN
Problem: Pain Management  Goal: Pain level will decrease to patient's comfort goal  Outcome: PROGRESSING AS EXPECTED  No c/o pain. Pt will notify RN of any pain and verbalized understanding of available pain medications.     Problem: Altered physiologic condition related to immediate post-delivery state and potential for bleeding/hemorrhage  Goal: Patient physiologically stable as evidenced by normal lochia, palpable uterine involution and vital signs within normal limits  Outcome: PROGRESSING AS EXPECTED  Fundus firm. Lochia light-scant.   Intervention: Massage fundus as necessary to prevent excessive lochia  Fundus firm.

## 2017-11-10 NOTE — PROGRESS NOTES
S:  Feels better. Minimal pain. Placing baby up for adoption.  O: Afebrile       BP: 141/95, 139/96, 127//96       Fundus firm  A:  PPD 2        Resolving preeclampsia but very labile BP  P;   Defer discharge due to BP but also pt with minimal care.         Keep antihypertensive therapy    Time:  15 minutes

## 2017-11-10 NOTE — PROGRESS NOTES
Spoke with Dr Kathleen over the phone. Updated BP improved 129/92. No new orders received at this time.

## 2017-11-11 LAB
ALBUMIN SERPL BCP-MCNC: 3.1 G/DL (ref 3.2–4.9)
ALBUMIN/GLOB SERPL: 0.8 G/DL
ALP SERPL-CCNC: 203 U/L (ref 30–99)
ALT SERPL-CCNC: 94 U/L (ref 2–50)
ANION GAP SERPL CALC-SCNC: 12 MMOL/L (ref 0–11.9)
AST SERPL-CCNC: 139 U/L (ref 12–45)
BILIRUB SERPL-MCNC: 0.3 MG/DL (ref 0.1–1.5)
BUN SERPL-MCNC: 20 MG/DL (ref 8–22)
CALCIUM SERPL-MCNC: 9 MG/DL (ref 8.5–10.5)
CHLORIDE SERPL-SCNC: 100 MMOL/L (ref 96–112)
CO2 SERPL-SCNC: 21 MMOL/L (ref 20–33)
CREAT SERPL-MCNC: 1.13 MG/DL (ref 0.5–1.4)
ERYTHROCYTE [DISTWIDTH] IN BLOOD BY AUTOMATED COUNT: 73.3 FL (ref 35.9–50)
GFR SERPL CREATININE-BSD FRML MDRD: 55 ML/MIN/1.73 M 2
GLOBULIN SER CALC-MCNC: 3.9 G/DL (ref 1.9–3.5)
GLUCOSE SERPL-MCNC: 83 MG/DL (ref 65–99)
HCT VFR BLD AUTO: 35.2 % (ref 37–47)
HGB BLD-MCNC: 10.5 G/DL (ref 12–16)
MAGNESIUM SERPL-MCNC: 6.3 MG/DL (ref 1.5–2.5)
MCH RBC QN AUTO: 22.4 PG (ref 27–33)
MCHC RBC AUTO-ENTMCNC: 29.8 G/DL (ref 33.6–35)
MCV RBC AUTO: 75.1 FL (ref 81.4–97.8)
PLATELET # BLD AUTO: 235 K/UL (ref 164–446)
PMV BLD AUTO: 9.7 FL (ref 9–12.9)
POTASSIUM SERPL-SCNC: 4.5 MMOL/L (ref 3.6–5.5)
PROT SERPL-MCNC: 7 G/DL (ref 6–8.2)
RBC # BLD AUTO: 4.69 M/UL (ref 4.2–5.4)
SODIUM SERPL-SCNC: 133 MMOL/L (ref 135–145)
WBC # BLD AUTO: 16.1 K/UL (ref 4.8–10.8)

## 2017-11-11 PROCEDURE — 700105 HCHG RX REV CODE 258: Performed by: OBSTETRICS & GYNECOLOGY

## 2017-11-11 PROCEDURE — 700101 HCHG RX REV CODE 250: Performed by: OBSTETRICS & GYNECOLOGY

## 2017-11-11 PROCEDURE — 700102 HCHG RX REV CODE 250 W/ 637 OVERRIDE(OP): Performed by: MEDICAL GENETICS

## 2017-11-11 PROCEDURE — 700102 HCHG RX REV CODE 250 W/ 637 OVERRIDE(OP): Performed by: OBSTETRICS & GYNECOLOGY

## 2017-11-11 PROCEDURE — 700111 HCHG RX REV CODE 636 W/ 250 OVERRIDE (IP): Performed by: OBSTETRICS & GYNECOLOGY

## 2017-11-11 PROCEDURE — 36415 COLL VENOUS BLD VENIPUNCTURE: CPT

## 2017-11-11 PROCEDURE — A9270 NON-COVERED ITEM OR SERVICE: HCPCS | Performed by: OBSTETRICS & GYNECOLOGY

## 2017-11-11 PROCEDURE — 85027 COMPLETE CBC AUTOMATED: CPT

## 2017-11-11 PROCEDURE — 700112 HCHG RX REV CODE 229: Performed by: OBSTETRICS & GYNECOLOGY

## 2017-11-11 PROCEDURE — 83735 ASSAY OF MAGNESIUM: CPT

## 2017-11-11 PROCEDURE — 80053 COMPREHEN METABOLIC PANEL: CPT

## 2017-11-11 PROCEDURE — A9270 NON-COVERED ITEM OR SERVICE: HCPCS | Performed by: MEDICAL GENETICS

## 2017-11-11 PROCEDURE — 700111 HCHG RX REV CODE 636 W/ 250 OVERRIDE (IP): Performed by: MEDICAL GENETICS

## 2017-11-11 PROCEDURE — 770002 HCHG ROOM/CARE - OB PRIVATE (112)

## 2017-11-11 RX ORDER — LABETALOL HYDROCHLORIDE 5 MG/ML
20-80 INJECTION, SOLUTION INTRAVENOUS PRN
Status: DISCONTINUED | OUTPATIENT
Start: 2017-11-11 | End: 2017-11-12

## 2017-11-11 RX ORDER — MAGNESIUM SULFATE HEPTAHYDRATE 40 MG/ML
4 INJECTION, SOLUTION INTRAVENOUS ONCE
Status: COMPLETED | OUTPATIENT
Start: 2017-11-11 | End: 2017-11-11

## 2017-11-11 RX ORDER — SODIUM CHLORIDE, SODIUM LACTATE, POTASSIUM CHLORIDE, CALCIUM CHLORIDE 600; 310; 30; 20 MG/100ML; MG/100ML; MG/100ML; MG/100ML
INJECTION, SOLUTION INTRAVENOUS CONTINUOUS
Status: DISCONTINUED | OUTPATIENT
Start: 2017-11-11 | End: 2017-11-13

## 2017-11-11 RX ORDER — HYDRALAZINE HYDROCHLORIDE 20 MG/ML
5-10 INJECTION INTRAMUSCULAR; INTRAVENOUS PRN
Status: DISCONTINUED | OUTPATIENT
Start: 2017-11-11 | End: 2017-11-12

## 2017-11-11 RX ORDER — LABETALOL 100 MG/1
200 TABLET, FILM COATED ORAL EVERY 8 HOURS
Status: DISCONTINUED | OUTPATIENT
Start: 2017-11-12 | End: 2017-11-12

## 2017-11-11 RX ORDER — CALCIUM GLUCONATE 94 MG/ML
1 INJECTION, SOLUTION INTRAVENOUS PRN
Status: DISCONTINUED | OUTPATIENT
Start: 2017-11-11 | End: 2017-11-13

## 2017-11-11 RX ORDER — MAGNESIUM SULFATE HEPTAHYDRATE 40 MG/ML
1 INJECTION, SOLUTION INTRAVENOUS CONTINUOUS
Status: DISCONTINUED | OUTPATIENT
Start: 2017-11-11 | End: 2017-11-13

## 2017-11-11 RX ADMIN — SODIUM CHLORIDE, POTASSIUM CHLORIDE, SODIUM LACTATE AND CALCIUM CHLORIDE: 600; 310; 30; 20 INJECTION, SOLUTION INTRAVENOUS at 20:42

## 2017-11-11 RX ADMIN — ONDANSETRON 4 MG: 2 INJECTION INTRAMUSCULAR; INTRAVENOUS at 20:39

## 2017-11-11 RX ADMIN — Medication 1 TABLET: at 08:43

## 2017-11-11 RX ADMIN — LABETALOL HYDROCHLORIDE 100 MG: 100 TABLET, FILM COATED ORAL at 08:43

## 2017-11-11 RX ADMIN — LABETALOL HYDROCHLORIDE 20 MG: 5 INJECTION, SOLUTION INTRAVENOUS at 23:20

## 2017-11-11 RX ADMIN — FERROUS GLUCONATE 324 MG: 324 TABLET ORAL at 08:43

## 2017-11-11 RX ADMIN — MAGNESIUM SULFATE IN WATER 4 G: 40 INJECTION, SOLUTION INTRAVENOUS at 20:43

## 2017-11-11 RX ADMIN — MAGNESIUM SULFATE IN WATER 3 G/HR: 40 INJECTION, SOLUTION INTRAVENOUS at 21:05

## 2017-11-11 RX ADMIN — DOCUSATE SODIUM 100 MG: 100 CAPSULE ORAL at 08:43

## 2017-11-11 RX ADMIN — LABETALOL HYDROCHLORIDE 100 MG: 100 TABLET, FILM COATED ORAL at 21:14

## 2017-11-11 RX ADMIN — NIFEDIPINE 60 MG: 60 TABLET, FILM COATED, EXTENDED RELEASE ORAL at 08:43

## 2017-11-11 ASSESSMENT — PAIN SCALES - GENERAL
PAINLEVEL_OUTOF10: 0

## 2017-11-11 NOTE — DISCHARGE PLANNING
Infant's name on birth confirmation is Yaakov Walls.    Ongoing:  Spoke with Corine DONALDSON who advises mother is not being discharged due to HTN and is concerned she will not have transportation home.    -Met with mother, Stephanie Walls and adopt couple, Brandon and Hima Singletary (mother's sister and brother-in-law) who advise family will be returning to take Stephanie home when she is discharged. Stephanie would like Brandon and Hima to be able to visit independently in the NICU and she has given them the password to call for updates. Advised family that  will let the NICU know Brandon and Hima will be coming to visit alone. Family will attempt to locate an  to assist with the adoption next week. Advised Stephanie that she will continue to be decision maker for infant and infant will be discharged to her if custody has not been established for Hima and Brandon. Family is agreeable with that plan as they all live together in [place].  Answered multiple questions for family and provided support.    -Hima and Brandon will be returning home to [facility] for work and will be returning on Thursday.    -Updated Corine DONALDSON.    -Plan:   will continue to assist as needed.

## 2017-11-11 NOTE — PROGRESS NOTES
S: Pt with BP increase yesterday requiring acute therapy with nifedipine.  PT reports she has no transportation to return home unless leaving today.  O: Afebrile       BP: 132/96, 139/91, 127/89 mmHg       Fundus:  Firm       AST: 52 BUN 16  A: PPD 3 probable reexacerbation of preeclampsia  P:  Defer discharge        Monitor BP        Recheck CMP later today.    Time: 15 minutes

## 2017-11-11 NOTE — PROGRESS NOTES
0656-Report received at bedside from Alexa RN, assumed care of patient.  Encouraged to call with needs, denies at this time.  0875-Assessment completed, fundus is firm, lochia light and vital signs within defined parameters. Patient is ambulating and voiding without difficulty.   Discussed with patient pain medication plan, patient requesting to be medicated PRN, will call for medication.  Plan of care discussed, patient verbalized understanding. Hourly rounding implemented, call light within reach.

## 2017-11-11 NOTE — CARE PLAN
Problem: Communication  Goal: The ability to communicate needs accurately and effectively will improve  Outcome: PROGRESSING AS EXPECTED  Patient ambulating to bathroom, taking adequate PO fluids and voiding. All questions and concerns answered.    Problem: Pain Management  Goal: Pain level will decrease to patient's comfort goal  Outcome: PROGRESSING AS EXPECTED  Patient would like to call for PRN pain meds when needed.

## 2017-11-12 LAB
ALBUMIN SERPL BCP-MCNC: 3.1 G/DL (ref 3.2–4.9)
ALBUMIN/GLOB SERPL: 0.8 G/DL
ALP SERPL-CCNC: 210 U/L (ref 30–99)
ALT SERPL-CCNC: 97 U/L (ref 2–50)
ANION GAP SERPL CALC-SCNC: 10 MMOL/L (ref 0–11.9)
AST SERPL-CCNC: 133 U/L (ref 12–45)
BILIRUB SERPL-MCNC: 0.2 MG/DL (ref 0.1–1.5)
BUN SERPL-MCNC: 18 MG/DL (ref 8–22)
CALCIUM SERPL-MCNC: 8.8 MG/DL (ref 8.5–10.5)
CHLORIDE SERPL-SCNC: 99 MMOL/L (ref 96–112)
CO2 SERPL-SCNC: 22 MMOL/L (ref 20–33)
CREAT SERPL-MCNC: 0.72 MG/DL (ref 0.5–1.4)
ERYTHROCYTE [DISTWIDTH] IN BLOOD BY AUTOMATED COUNT: 73.3 FL (ref 35.9–50)
GFR SERPL CREATININE-BSD FRML MDRD: >60 ML/MIN/1.73 M 2
GLOBULIN SER CALC-MCNC: 3.8 G/DL (ref 1.9–3.5)
GLUCOSE SERPL-MCNC: 73 MG/DL (ref 65–99)
HCT VFR BLD AUTO: 35.3 % (ref 37–47)
HGB BLD-MCNC: 10.9 G/DL (ref 12–16)
MAGNESIUM SERPL-MCNC: 4.9 MG/DL (ref 1.5–2.5)
MAGNESIUM SERPL-MCNC: 5.7 MG/DL (ref 1.5–2.5)
MAGNESIUM SERPL-MCNC: 7.8 MG/DL (ref 1.5–2.5)
MCH RBC QN AUTO: 23.1 PG (ref 27–33)
MCHC RBC AUTO-ENTMCNC: 30.9 G/DL (ref 33.6–35)
MCV RBC AUTO: 74.9 FL (ref 81.4–97.8)
PLATELET # BLD AUTO: 226 K/UL (ref 164–446)
PMV BLD AUTO: 9.6 FL (ref 9–12.9)
POTASSIUM SERPL-SCNC: 4.3 MMOL/L (ref 3.6–5.5)
PROT SERPL-MCNC: 6.9 G/DL (ref 6–8.2)
RBC # BLD AUTO: 4.71 M/UL (ref 4.2–5.4)
SODIUM SERPL-SCNC: 131 MMOL/L (ref 135–145)
WBC # BLD AUTO: 11.7 K/UL (ref 4.8–10.8)

## 2017-11-12 PROCEDURE — 700102 HCHG RX REV CODE 250 W/ 637 OVERRIDE(OP): Performed by: MEDICAL GENETICS

## 2017-11-12 PROCEDURE — 85027 COMPLETE CBC AUTOMATED: CPT

## 2017-11-12 PROCEDURE — 700102 HCHG RX REV CODE 250 W/ 637 OVERRIDE(OP): Performed by: OBSTETRICS & GYNECOLOGY

## 2017-11-12 PROCEDURE — A9270 NON-COVERED ITEM OR SERVICE: HCPCS | Performed by: OBSTETRICS & GYNECOLOGY

## 2017-11-12 PROCEDURE — A9270 NON-COVERED ITEM OR SERVICE: HCPCS | Performed by: MEDICAL GENETICS

## 2017-11-12 PROCEDURE — 83735 ASSAY OF MAGNESIUM: CPT | Mod: 91

## 2017-11-12 PROCEDURE — 36415 COLL VENOUS BLD VENIPUNCTURE: CPT

## 2017-11-12 PROCEDURE — 700105 HCHG RX REV CODE 258: Performed by: OBSTETRICS & GYNECOLOGY

## 2017-11-12 PROCEDURE — 80053 COMPREHEN METABOLIC PANEL: CPT

## 2017-11-12 PROCEDURE — 770002 HCHG ROOM/CARE - OB PRIVATE (112)

## 2017-11-12 PROCEDURE — 700111 HCHG RX REV CODE 636 W/ 250 OVERRIDE (IP): Performed by: OBSTETRICS & GYNECOLOGY

## 2017-11-12 RX ORDER — LABETALOL 100 MG/1
100 TABLET, FILM COATED ORAL EVERY 8 HOURS
Status: DISCONTINUED | OUTPATIENT
Start: 2017-11-12 | End: 2017-11-12

## 2017-11-12 RX ORDER — LABETALOL 100 MG/1
100 TABLET, FILM COATED ORAL EVERY 8 HOURS
Status: DISCONTINUED | OUTPATIENT
Start: 2017-11-12 | End: 2017-11-14

## 2017-11-12 RX ADMIN — SODIUM CHLORIDE, POTASSIUM CHLORIDE, SODIUM LACTATE AND CALCIUM CHLORIDE: 600; 310; 30; 20 INJECTION, SOLUTION INTRAVENOUS at 18:05

## 2017-11-12 RX ADMIN — LABETALOL HYDROCHLORIDE 100 MG: 100 TABLET, FILM COATED ORAL at 07:17

## 2017-11-12 RX ADMIN — FERROUS GLUCONATE 324 MG: 324 TABLET ORAL at 08:02

## 2017-11-12 RX ADMIN — SODIUM CHLORIDE, POTASSIUM CHLORIDE, SODIUM LACTATE AND CALCIUM CHLORIDE: 600; 310; 30; 20 INJECTION, SOLUTION INTRAVENOUS at 08:57

## 2017-11-12 RX ADMIN — LABETALOL HYDROCHLORIDE 100 MG: 100 TABLET, FILM COATED ORAL at 14:22

## 2017-11-12 RX ADMIN — Medication 1 TABLET: at 08:52

## 2017-11-12 RX ADMIN — LABETALOL HYDROCHLORIDE 100 MG: 100 TABLET, FILM COATED ORAL at 22:16

## 2017-11-12 RX ADMIN — NIFEDIPINE 60 MG: 60 TABLET, FILM COATED, EXTENDED RELEASE ORAL at 08:52

## 2017-11-12 RX ADMIN — MAGNESIUM SULFATE IN WATER 2 G/HR: 40 INJECTION, SOLUTION INTRAVENOUS at 05:46

## 2017-11-12 ASSESSMENT — PAIN SCALES - GENERAL
PAINLEVEL_OUTOF10: 0
PAINLEVEL_OUTOF10: 0
PAINLEVEL_OUTOF10: 4
PAINLEVEL_OUTOF10: 0
PAINLEVEL_OUTOF10: 4
PAINLEVEL_OUTOF10: 0

## 2017-11-12 NOTE — CARE PLAN
Problem: Risk for Deep Vein Thrombosis/Venous Thromboembolism  Goal: DVT/VTE Prevention Measures in Place    Intervention: Intermittent sequential compression device in place per MD order  SCDs in place. Patient educated.       Problem: Risk for Infection, Impaired Wound Healing  Goal: Remain free from signs and symptoms of infection    Intervention: Instruct Patient regarding signs and symptoms of infection  Patient educated. Remains afebrile.

## 2017-11-12 NOTE — CARE PLAN
Problem: Risk for Deep Vein Thrombosis/Venous Thromboembolism  Goal: DVT/VTE Prevention Measures in Place    Intervention: Intermittent sequential compression device in place per MD order  SCDs on and functioning.      Problem: Risk for Infection, Impaired Wound Healing  Goal: Remain free from signs and symptoms of infection  Outcome: PROGRESSING AS EXPECTED  Pt afebrile.    Problem: Pain  Goal: Alleviation of Pain or a reduction in pain to the patient's comfort goal  Outcome: PROGRESSING AS EXPECTED  No c/o pain and no intervention requested.

## 2017-11-12 NOTE — PROGRESS NOTES
Notified by RN of BP increased to  156/105.  AST now at 139 and ALT at 94.  BUN 20.  Plt: 235 K  Findings are consistent with severe preeclampsia.  Pt aware of situation and rationale for restart magnesium sulfate.    Time:  35 minutes (total for the day)

## 2017-11-12 NOTE — PROGRESS NOTES
Notified Dr. Kathleen of patient's blood pressure and lab results, new order is to start magnesium sulfate.

## 2017-11-12 NOTE — PROGRESS NOTES
Per Corine DONALDSON, Dr. Kathleen ordered to transfer the patient to L&D for Magnesium infusion.

## 2017-11-12 NOTE — PROGRESS NOTES
0700- Report from CAMILLA Valencia, ANIKA. Patient complaining of HA. Labetalol changed from 200-100mg.     0745- Critical mag level of 7.8 reported. Updates to Dr. Kathleen. Orders to stop mag for an hour and restart at 1g/hr.     0855- Mag restarted 1g/hr. Patient educated. Patient asking to pump to empty breasts. Pump at bedside and patient able to use by herself.     1900- Report given to CAMILLA Valencia RN. POC discussed.

## 2017-11-12 NOTE — PROGRESS NOTES
[place] from Lab called with critical result of Magnesium at 6.3 at 2357. Critical lab result read back to [place].   Dr. Kathleen notified of critical lab result at 2358.  Critical lab result read back by Dr. Kathleen.

## 2017-11-12 NOTE — PROGRESS NOTES
S:  Feeling less magnesium sulfate side effects.  O: Afebrile       BP: 125/76 mmHg       Edema: trace       Magnesium level :7.8 this morning.  Stopped magnesium sulfate for 1 hour then restart now at 1 gm/hg       MEDS:  Labetalol 100 mg TID                    Procardia XL 60 mg daily                    Magnesium sulfated 1 gm/hour       AST: 133, ALT: 97       BUN: 18       Sodium: 131  A:  Recurring preeclampsia now with liver enzyme elevation.           PPD 4  P: Continue with magnesium sulfate until tonight.       Recheck CMP in am tomorrow.    Time:  25 minutes

## 2017-11-13 LAB
ALBUMIN SERPL BCP-MCNC: 2.8 G/DL (ref 3.2–4.9)
ALBUMIN/GLOB SERPL: 0.8 G/DL
ALP SERPL-CCNC: 200 U/L (ref 30–99)
ALT SERPL-CCNC: 80 U/L (ref 2–50)
ANION GAP SERPL CALC-SCNC: 5 MMOL/L (ref 0–11.9)
AST SERPL-CCNC: 71 U/L (ref 12–45)
BILIRUB SERPL-MCNC: 0.2 MG/DL (ref 0.1–1.5)
BUN SERPL-MCNC: 22 MG/DL (ref 8–22)
CALCIUM SERPL-MCNC: 8.5 MG/DL (ref 8.5–10.5)
CHLORIDE SERPL-SCNC: 103 MMOL/L (ref 96–112)
CO2 SERPL-SCNC: 25 MMOL/L (ref 20–33)
CREAT SERPL-MCNC: 0.86 MG/DL (ref 0.5–1.4)
ERYTHROCYTE [DISTWIDTH] IN BLOOD BY AUTOMATED COUNT: 74.9 FL (ref 35.9–50)
GFR SERPL CREATININE-BSD FRML MDRD: >60 ML/MIN/1.73 M 2
GLOBULIN SER CALC-MCNC: 3.7 G/DL (ref 1.9–3.5)
GLUCOSE SERPL-MCNC: 81 MG/DL (ref 65–99)
HCT VFR BLD AUTO: 35.8 % (ref 37–47)
HGB BLD-MCNC: 10.8 G/DL (ref 12–16)
MAGNESIUM SERPL-MCNC: 2.9 MG/DL (ref 1.5–2.5)
MCH RBC QN AUTO: 23 PG (ref 27–33)
MCHC RBC AUTO-ENTMCNC: 30.2 G/DL (ref 33.6–35)
MCV RBC AUTO: 76.3 FL (ref 81.4–97.8)
PLATELET # BLD AUTO: 251 K/UL (ref 164–446)
PMV BLD AUTO: 9.5 FL (ref 9–12.9)
POTASSIUM SERPL-SCNC: 4.3 MMOL/L (ref 3.6–5.5)
PROT SERPL-MCNC: 6.5 G/DL (ref 6–8.2)
RBC # BLD AUTO: 4.69 M/UL (ref 4.2–5.4)
SODIUM SERPL-SCNC: 133 MMOL/L (ref 135–145)
WBC # BLD AUTO: 9.9 K/UL (ref 4.8–10.8)

## 2017-11-13 PROCEDURE — 80053 COMPREHEN METABOLIC PANEL: CPT

## 2017-11-13 PROCEDURE — 85027 COMPLETE CBC AUTOMATED: CPT

## 2017-11-13 PROCEDURE — 700102 HCHG RX REV CODE 250 W/ 637 OVERRIDE(OP): Performed by: OBSTETRICS & GYNECOLOGY

## 2017-11-13 PROCEDURE — 700102 HCHG RX REV CODE 250 W/ 637 OVERRIDE(OP): Performed by: MEDICAL GENETICS

## 2017-11-13 PROCEDURE — 36415 COLL VENOUS BLD VENIPUNCTURE: CPT

## 2017-11-13 PROCEDURE — A9270 NON-COVERED ITEM OR SERVICE: HCPCS | Performed by: OBSTETRICS & GYNECOLOGY

## 2017-11-13 PROCEDURE — 770002 HCHG ROOM/CARE - OB PRIVATE (112)

## 2017-11-13 PROCEDURE — A9270 NON-COVERED ITEM OR SERVICE: HCPCS | Performed by: MEDICAL GENETICS

## 2017-11-13 PROCEDURE — 83735 ASSAY OF MAGNESIUM: CPT

## 2017-11-13 RX ORDER — LABETALOL 100 MG/1
TABLET, FILM COATED ORAL
Status: DISCONTINUED
Start: 2017-11-13 | End: 2017-11-14

## 2017-11-13 RX ADMIN — LABETALOL HYDROCHLORIDE 100 MG: 100 TABLET, FILM COATED ORAL at 21:44

## 2017-11-13 RX ADMIN — NIFEDIPINE 60 MG: 60 TABLET, FILM COATED, EXTENDED RELEASE ORAL at 09:09

## 2017-11-13 RX ADMIN — LABETALOL HYDROCHLORIDE 100 MG: 100 TABLET, FILM COATED ORAL at 07:13

## 2017-11-13 RX ADMIN — FERROUS GLUCONATE 324 MG: 324 TABLET ORAL at 07:13

## 2017-11-13 RX ADMIN — LABETALOL HYDROCHLORIDE 100 MG: 100 TABLET, FILM COATED ORAL at 14:18

## 2017-11-13 ASSESSMENT — PAIN SCALES - GENERAL
PAINLEVEL_OUTOF10: 0

## 2017-11-13 NOTE — CARE PLAN
Problem: Altered physiologic condition related to immediate post-delivery state and potential for bleeding/hemorrhage  Goal: Patient physiologically stable as evidenced by normal lochia, palpable uterine involution and vital signs within normal limits  Outcome: PROGRESSING AS EXPECTED  Patient has scant lochia with a firm palpable uterus.  Vital signs are within defined limits.  Assessment will continue.     Problem: Alteration in comfort related to episiotomy, vaginal repair and/or after birth pains  Goal: Patient verbalizes acceptable pain level  Outcome: PROGRESSING AS EXPECTED  Patient will ask for pain medication when needed.  Pain assessment will continue.

## 2017-11-13 NOTE — CARE PLAN
Problem: Risk for Deep Vein Thrombosis/Venous Thromboembolism  Goal: DVT/VTE Prevention Measures in Place  Outcome: PROGRESSING AS EXPECTED  SCDs in place and functioning.    Problem: Risk for Infection, Impaired Wound Healing  Goal: Remain free from signs and symptoms of infection  Outcome: PROGRESSING AS EXPECTED  PT afebrile.    Problem: Pain  Goal: Alleviation of Pain or a reduction in pain to the patient's comfort goal  Outcome: PROGRESSING AS EXPECTED  No c/o pain or intervention requested.

## 2017-11-13 NOTE — PROGRESS NOTES
1900- Report from BAL Allen RN. Pt resting in bed. POC discussed.    2105- Pt up to BR. Steady ambulation. Successful void. Pt and belongings transferred to PPU without incident. Report to CHRISTIANA Sy RN. POC discussed. Orders reviewed.

## 2017-11-13 NOTE — PROGRESS NOTES
S:  Feeling well.  O: Afebrile       BP: 132/89mmHg       Edema: trace       MEDS:  Labetalol 100 mg TID                    Procardia XL 60 mg daily                    Magnesium sulfated 1 gm/hour       AST: 71, ALT: 80       BUN: 22       Sodium: 133  A:  Recurring preeclampsia now with liver enzyme elevation. Increasing BUN          PPD 6  P: Defer discharge as pt is demonstrating preeclampsia       Recheck CMP in am tomorrow.     Time:  15 minutes

## 2017-11-13 NOTE — CARE PLAN
Problem: Altered physiologic condition related to immediate post-delivery state and potential for bleeding/hemorrhage  Goal: Patient physiologically stable as evidenced by normal lochia, palpable uterine involution and vital signs within normal limits  Outcome: PROGRESSING AS EXPECTED  VSS. Fundus firm. Lochia light.    Problem: Alteration in comfort related to episiotomy, vaginal repair and/or after birth pains  Goal: Patient verbalizes acceptable pain level  Outcome: PROGRESSING AS EXPECTED  Pt declines pain at this time. Will medicate per MAR as needed.

## 2017-11-13 NOTE — PROGRESS NOTES
Pt arrived to S[facility] via wheelchair with L&D RN. Report received from Sugey. Assessment completed. Denies pain at this time. Pt oriented to room, call light, emergency light, and unit routine. Encouraged to call for assistance.

## 2017-11-14 LAB
ALBUMIN SERPL BCP-MCNC: 3 G/DL (ref 3.2–4.9)
ALBUMIN/GLOB SERPL: 0.9 G/DL
ALP SERPL-CCNC: 180 U/L (ref 30–99)
ALT SERPL-CCNC: 66 U/L (ref 2–50)
ANION GAP SERPL CALC-SCNC: 8 MMOL/L (ref 0–11.9)
AST SERPL-CCNC: 46 U/L (ref 12–45)
BILIRUB SERPL-MCNC: 0.2 MG/DL (ref 0.1–1.5)
BUN SERPL-MCNC: 23 MG/DL (ref 8–22)
CALCIUM SERPL-MCNC: 9 MG/DL (ref 8.5–10.5)
CHLORIDE SERPL-SCNC: 106 MMOL/L (ref 96–112)
CO2 SERPL-SCNC: 23 MMOL/L (ref 20–33)
CREAT SERPL-MCNC: 0.8 MG/DL (ref 0.5–1.4)
ERYTHROCYTE [DISTWIDTH] IN BLOOD BY AUTOMATED COUNT: 77 FL (ref 35.9–50)
GFR SERPL CREATININE-BSD FRML MDRD: >60 ML/MIN/1.73 M 2
GLOBULIN SER CALC-MCNC: 3.2 G/DL (ref 1.9–3.5)
GLUCOSE SERPL-MCNC: 77 MG/DL (ref 65–99)
HCT VFR BLD AUTO: 33.3 % (ref 37–47)
HGB BLD-MCNC: 10 G/DL (ref 12–16)
MCH RBC QN AUTO: 23.1 PG (ref 27–33)
MCHC RBC AUTO-ENTMCNC: 30 G/DL (ref 33.6–35)
MCV RBC AUTO: 77.1 FL (ref 81.4–97.8)
PLATELET # BLD AUTO: 260 K/UL (ref 164–446)
PMV BLD AUTO: 9.8 FL (ref 9–12.9)
POTASSIUM SERPL-SCNC: 4.6 MMOL/L (ref 3.6–5.5)
PROT SERPL-MCNC: 6.2 G/DL (ref 6–8.2)
RBC # BLD AUTO: 4.32 M/UL (ref 4.2–5.4)
SODIUM SERPL-SCNC: 137 MMOL/L (ref 135–145)
WBC # BLD AUTO: 9 K/UL (ref 4.8–10.8)

## 2017-11-14 PROCEDURE — A9270 NON-COVERED ITEM OR SERVICE: HCPCS | Performed by: MEDICAL GENETICS

## 2017-11-14 PROCEDURE — 85027 COMPLETE CBC AUTOMATED: CPT

## 2017-11-14 PROCEDURE — 36415 COLL VENOUS BLD VENIPUNCTURE: CPT

## 2017-11-14 PROCEDURE — 700102 HCHG RX REV CODE 250 W/ 637 OVERRIDE(OP): Performed by: OBSTETRICS & GYNECOLOGY

## 2017-11-14 PROCEDURE — A9270 NON-COVERED ITEM OR SERVICE: HCPCS | Performed by: OBSTETRICS & GYNECOLOGY

## 2017-11-14 PROCEDURE — 700102 HCHG RX REV CODE 250 W/ 637 OVERRIDE(OP): Performed by: MEDICAL GENETICS

## 2017-11-14 PROCEDURE — 700112 HCHG RX REV CODE 229: Performed by: OBSTETRICS & GYNECOLOGY

## 2017-11-14 PROCEDURE — 80053 COMPREHEN METABOLIC PANEL: CPT

## 2017-11-14 PROCEDURE — 770002 HCHG ROOM/CARE - OB PRIVATE (112)

## 2017-11-14 RX ORDER — LABETALOL 100 MG/1
200 TABLET, FILM COATED ORAL EVERY 8 HOURS
Status: DISCONTINUED | OUTPATIENT
Start: 2017-11-14 | End: 2017-11-15 | Stop reason: HOSPADM

## 2017-11-14 RX ADMIN — DOCUSATE SODIUM 100 MG: 100 CAPSULE ORAL at 08:41

## 2017-11-14 RX ADMIN — LABETALOL HYDROCHLORIDE 100 MG: 100 TABLET, FILM COATED ORAL at 05:44

## 2017-11-14 RX ADMIN — NIFEDIPINE 60 MG: 60 TABLET, FILM COATED, EXTENDED RELEASE ORAL at 08:41

## 2017-11-14 RX ADMIN — LABETALOL HYDROCHLORIDE 200 MG: 100 TABLET, FILM COATED ORAL at 22:16

## 2017-11-14 RX ADMIN — LABETALOL HYDROCHLORIDE 200 MG: 100 TABLET, FILM COATED ORAL at 14:03

## 2017-11-14 RX ADMIN — FERROUS GLUCONATE 324 MG: 324 TABLET ORAL at 08:41

## 2017-11-14 ASSESSMENT — PAIN SCALES - GENERAL
PAINLEVEL_OUTOF10: 0

## 2017-11-14 NOTE — CARE PLAN
Problem: Potential for postpartum infection related to presence of episiotomy/vaginal tear and/or uterine contamination  Goal: Patient will be absent from signs and symptoms of infection  Outcome: PROGRESSING AS EXPECTED  Patient remains afebrile.     Problem: Alteration in comfort related to episiotomy, vaginal repair and/or after birth pains  Goal: Patient is able to ambulate, care for self and infant  Outcome: PROGRESSING AS EXPECTED  Patient reports pain of 0/10 and declines any pain medication at this time. Patient states she will call if needing pain medication.

## 2017-11-14 NOTE — PROGRESS NOTES
S:  Feeling well.  O: Afebrile       BP: 131/97 mmHgmmHg       Edema: none       MEDS:  Labetalol 100 mg TID                    Procardia XL 60 mg daily                           AST: 46, ALT: 66       BUN: 23       Sodium: 137  A:  Resolving preeclampsia with liver enzyme elevation. Increased BUN          PPD 7  P: Hold discharge until tomorrow      Increase labetalol to 200 mg TID     Time:  15 minutes

## 2017-11-14 NOTE — PROGRESS NOTES
Assessment complete. VSS. Fundus firm, lochia light. Pt denies pain at this time. Pt will call to request pain medications PRN. Pt pumping independently and walking to NICU by self. States voiding without difficulty. POC discussed. Encouraged to call with needs. Call light in place

## 2017-11-14 NOTE — PROGRESS NOTES
Problem: Breastfeeding  Goal: Mom maintains milk supply when infant ill/premature  Outcome: PROGRESSING AS EXPECTED  HG pump is in room, and MOB has been pumping and getting increasing amounts of colostrum/milk. MOB reports history of low milk supply. And plans to only pump for short tem.     URMILA has medicaid, and will need to establish with [facility].  She does have a manual pump at home and she states she can still use that pump.       URMILA has no other questions or concerns regarding breastfeeding. Encouraged to call for assistance when latching infant in NICU.

## 2017-11-15 VITALS
HEART RATE: 93 BPM | TEMPERATURE: 97.8 F | HEIGHT: 67 IN | SYSTOLIC BLOOD PRESSURE: 121 MMHG | DIASTOLIC BLOOD PRESSURE: 74 MMHG | RESPIRATION RATE: 20 BRPM | WEIGHT: 149 LBS | BODY MASS INDEX: 23.39 KG/M2 | OXYGEN SATURATION: 99 %

## 2017-11-15 PROBLEM — O09.30 LIMITED PRENATAL CARE: Status: RESOLVED | Noted: 2017-10-27 | Resolved: 2017-11-15

## 2017-11-15 PROBLEM — O99.019 ANEMIA AFFECTING PREGNANCY: Status: RESOLVED | Noted: 2017-10-27 | Resolved: 2017-11-15

## 2017-11-15 PROBLEM — O36.5930 POOR FETAL GROWTH AFFECTING MANAGEMENT OF MOTHER IN THIRD TRIMESTER: Status: RESOLVED | Noted: 2017-10-29 | Resolved: 2017-11-15

## 2017-11-15 PROBLEM — O14.90 PREECLAMPSIA: Status: RESOLVED | Noted: 2017-10-27 | Resolved: 2017-11-15

## 2017-11-15 LAB
ALBUMIN SERPL BCP-MCNC: 3 G/DL (ref 3.2–4.9)
ALBUMIN/GLOB SERPL: 0.8 G/DL
ALP SERPL-CCNC: 161 U/L (ref 30–99)
ALT SERPL-CCNC: 68 U/L (ref 2–50)
ANION GAP SERPL CALC-SCNC: 12 MMOL/L (ref 0–11.9)
AST SERPL-CCNC: 54 U/L (ref 12–45)
BILIRUB SERPL-MCNC: 0.2 MG/DL (ref 0.1–1.5)
BUN SERPL-MCNC: 19 MG/DL (ref 8–22)
CALCIUM SERPL-MCNC: 9.2 MG/DL (ref 8.5–10.5)
CHLORIDE SERPL-SCNC: 106 MMOL/L (ref 96–112)
CO2 SERPL-SCNC: 21 MMOL/L (ref 20–33)
CREAT SERPL-MCNC: 0.92 MG/DL (ref 0.5–1.4)
ERYTHROCYTE [DISTWIDTH] IN BLOOD BY AUTOMATED COUNT: 78.3 FL (ref 35.9–50)
GFR SERPL CREATININE-BSD FRML MDRD: >60 ML/MIN/1.73 M 2
GLOBULIN SER CALC-MCNC: 3.6 G/DL (ref 1.9–3.5)
GLUCOSE SERPL-MCNC: 68 MG/DL (ref 65–99)
HCT VFR BLD AUTO: 34.2 % (ref 37–47)
HGB BLD-MCNC: 10 G/DL (ref 12–16)
MCH RBC QN AUTO: 22.6 PG (ref 27–33)
MCHC RBC AUTO-ENTMCNC: 29.2 G/DL (ref 33.6–35)
MCV RBC AUTO: 77.2 FL (ref 81.4–97.8)
PLATELET # BLD AUTO: 300 K/UL (ref 164–446)
PMV BLD AUTO: 9.8 FL (ref 9–12.9)
POTASSIUM SERPL-SCNC: 4.5 MMOL/L (ref 3.6–5.5)
PROT SERPL-MCNC: 6.6 G/DL (ref 6–8.2)
RBC # BLD AUTO: 4.43 M/UL (ref 4.2–5.4)
SODIUM SERPL-SCNC: 139 MMOL/L (ref 135–145)
WBC # BLD AUTO: 9.1 K/UL (ref 4.8–10.8)

## 2017-11-15 PROCEDURE — 700102 HCHG RX REV CODE 250 W/ 637 OVERRIDE(OP): Performed by: OBSTETRICS & GYNECOLOGY

## 2017-11-15 PROCEDURE — A9270 NON-COVERED ITEM OR SERVICE: HCPCS | Performed by: OBSTETRICS & GYNECOLOGY

## 2017-11-15 PROCEDURE — 700102 HCHG RX REV CODE 250 W/ 637 OVERRIDE(OP): Performed by: MEDICAL GENETICS

## 2017-11-15 PROCEDURE — 80053 COMPREHEN METABOLIC PANEL: CPT

## 2017-11-15 PROCEDURE — A9270 NON-COVERED ITEM OR SERVICE: HCPCS | Performed by: MEDICAL GENETICS

## 2017-11-15 PROCEDURE — 85027 COMPLETE CBC AUTOMATED: CPT

## 2017-11-15 PROCEDURE — 36415 COLL VENOUS BLD VENIPUNCTURE: CPT

## 2017-11-15 RX ADMIN — FERROUS GLUCONATE 324 MG: 324 TABLET ORAL at 08:59

## 2017-11-15 RX ADMIN — LABETALOL HYDROCHLORIDE 200 MG: 100 TABLET, FILM COATED ORAL at 05:50

## 2017-11-15 RX ADMIN — NIFEDIPINE 60 MG: 60 TABLET, FILM COATED, EXTENDED RELEASE ORAL at 08:58

## 2017-11-15 RX ADMIN — LABETALOL HYDROCHLORIDE 200 MG: 100 TABLET, FILM COATED ORAL at 14:18

## 2017-11-15 ASSESSMENT — PAIN SCALES - GENERAL
PAINLEVEL_OUTOF10: 0
PAINLEVEL_OUTOF10: 0
PAINLEVEL_OUTOF10: 1
PAINLEVEL_OUTOF10: 0
PAINLEVEL_OUTOF10: 1
PAINLEVEL_OUTOF10: 2
PAINLEVEL_OUTOF10: 1

## 2017-11-15 ASSESSMENT — COPD QUESTIONNAIRES
HAVE YOU SMOKED AT LEAST 100 CIGARETTES IN YOUR ENTIRE LIFE: NO/DON'T KNOW
DO YOU EVER COUGH UP ANY MUCUS OR PHLEGM?: NO/ONLY WITH OCCASIONAL COLDS OR INFECTIONS
DURING THE PAST 4 WEEKS HOW MUCH DID YOU FEEL SHORT OF BREATH: NONE/LITTLE OF THE TIME
COPD SCREENING SCORE: 0

## 2017-11-15 NOTE — CARE PLAN
Problem: Potential knowledge deficit related to lack of understanding of self and  care  Goal: Patient will demonstrate ability to care for self and infant  Outcome: PROGRESSING AS EXPECTED  Patient is able to care for self with tolerable pain and has no questions regarding discharge instructions.    Problem: Potential anxiety related to difficulty adapting to parental role  Goal: Patient will verbalize and demonstrate effective bonding and parenting behavior  Outcome: PROGRESSING AS EXPECTED  Patient is adopting baby out to her sister, visits baby in NICU.

## 2017-11-15 NOTE — PROGRESS NOTES
Received report from Raina DONALDSON.  Assumed patient care. Assessment complete. Pt has no c/o pain at this time. Pt will call for pain meds as needed. Pt has labetalol 200 mg TID scheduled. Will continue postpartum care.

## 2017-11-15 NOTE — DISCHARGE SUMMARY
DATE OF ADMISSION:  10/25/2017    DATE OF DISCHARGE:  11/15/2017    The patient was accepted in transport from Dr. Vijay White as a   32-year-old  4, para 1, AB2, with a working due date of 2017 at   32 weeks' gestation with a diagnosis of rule out preeclampsia and limited   prenatal care.  The patient was watched on the antepartum unit at labor and   delivery at [facility] for signs or symptoms of recurring   pregnancy-induced hypertension deterioration and the patient was well   subsequent to admission with no signs or indications for delivery.    On 2017 at 34 weeks' gestation, the patient developed worsening of her   preeclampsia and concerns regarding her underlying renal disease.  The patient   was therefore delivered, achieved complete dilatation and was actively pushed   and she had a normal spontaneous vaginal delivery of a live born female   infant, Apgars 7 and 7.  There were no perineal lacerations or tears.  The   patient had been begun on magnesium sulfate, which was continued for   approximately 24 hours post-delivery.    The patient's postpartum course was complicated by recrudescence of her PIH   approximately 3 days post-delivery and for that reason, patient was restarted   on magnesium sulfate for seizure prophylaxis.    The patient's condition at discharge is good and the patient is being sent   home with antihypertensive medications to include Procardia-XL 60 once a day   and labetalol 200 mg p.o. q. 8 hours.  The patient has been instructed to   remain at pelvic rest for 6 weeks and to have her blood pressure checked in   approximately 1 week upon her return to [place].    Patient had her medication prescriptions given to her and will be followed   carefully subsequently.  Patient was also cautioned to be aware of the   possibility of pregnancy-induced hypertension in subsequent pregnancies.       ____________________________________     Eddie MONREAL  MD Dorothea    RNS / NTS    DD:  11/15/2017 06:53:39  DT:  11/15/2017 07:06:17    D#:  [id number]  Job#:  [id number]    cc: CHACORTA BUSTAMANTE MD

## 2017-11-15 NOTE — CARE PLAN
Problem: Alteration in comfort related to episiotomy, vaginal repair and/or after birth pains  Goal: Patient is able to ambulate, care for self and infant  Outcome: PROGRESSING AS EXPECTED  Pt is ambulating with a steady gait and can care for herself. Pt has call light within reach.  Goal: Patient verbalizes acceptable pain level  Outcome: PROGRESSING AS EXPECTED  Reviewed 0-10 Pain Scale and available pain meds with pt.

## 2017-11-15 NOTE — DISCHARGE INSTRUCTIONS
POSTPARTUM DISCHARGE INSTRUCTIONS FOR MOM    YOB: 1985   Age: 32 y.o.               Admit Date: 10/25/2017     Discharge Date: 11/15/2017  Attending Doctor:  Candido Kathleen M.D.                  Allergies:  Patient has no known allergies.    Discharged to home by car. Discharged via wheelchair, hospital escort: Yes.  Special equipment needed: Not Applicable  Belongings with: Personal  Be sure to schedule a follow-up appointment with your primary care doctor or any specialists as instructed.     Discharge Plan:   Smoking Cessation Offered: Patient Counseled  Influenza Vaccine Indication: Patient Refuses    REASONS TO CALL YOUR OBSTETRICIAN:  1.   Persistent fever or shaking chills (Temperature higher than 100.4)  2.   Heavy bleeding (soaking more than 1 pad per hour); Passing clots  3.   Foul odor from vagina  4.   Mastitis (Breast infection; breast pain, chills, fever, redness)  5.   Urinary pain, burning or frequency  6.   Episiotomy infection  7.   Abdominal incision infection  8.   Severe depression longer than 24 hours    HAND WASHING  · Prior to handling the baby.  · Before breastfeeding or bottle feeding baby.  · After using the bathroom or changing the baby's diaper.    WOUND CARE  Ask your physician for additional care instructions.  In general:    ·  Incision:      · Keep clean and dry.    · Do NOT lift anything heavier than your baby for up to 6 weeks.    · There should not be any opening or pus.      VAGINAL CARE  · Nothing inside vagina for 6 weeks: no sexual intercourse, tampons or douching.  · Bleeding may continue for 2-4 weeks.  Amount may vary.    · Call your physician for heavy bleeding which means soaking more than 1 pad per hour    BIRTH CONTROL  · It is possible to become pregnant at any time after delivery and while breastfeeding.  · Plan to discuss a method of birth control with your physician at your follow up visit. visit.    DIET AND ELIMINATION  · Eating more fiber (bran  "cereal, fruits, and vegetables) and drinking plenty of fluids will help to avoid constipation.  · Urinary frequency after childbirth is normal.    POSTPARTUM BLUES  During the first few days after birth, you may experience a sense of the \"blues\" which may include impatience, irritability or even crying.  These feeling come and go quickly.  However, as many as 1 in 10 women experience emotional symptoms known as postpartum depression.    Postpartum depression:  May start as early as the second or third day after delivery or take several weeks or months to develop.  Symptoms of \"blues\" are present, but are more intense:  Crying spells; loss of appetite; feelings of hopelessness or loss of control; fear of touching the baby; over concern or no concern at all about the baby; little or no concern about your own appearance/caring for yourself; and/or inability to sleep or excessive sleeping.  Contact your physician if you are experiencing any of these symptoms.    Crisis Hotline:  · [place] Crisis Hotline:  1-252-OKCRSPB  Or 1-655.143.9164  · [place] Crisis Hotline:  1-819.689.6313  Or 718-741-9487    PREVENTING SHAKEN BABY:  If you are angry or stressed, PUT THE BABY IN THE CRIB, step away, take some deep breaths, and wait until you are calm to care for the baby.  DO NOT SHAKE THE BABY.  You are not alone, call a supporter for help.    · Crisis Call Center 24/7 crisis line 031-716-2064 or 1-367.900.5609  · You can also text them, text \"ANSWER\" to 518027    QUIT SMOKING/TOBACCO USE:  I understand the use of any tobacco products increases my chance of suffering from future heart disease and could cause other illnesses which may shorten my life. Quitting the use of tobacco products is the single most important thing I can do to improve my health. For further information on smoking / tobacco cessation call a Toll Free Quit Line at 1-541.735.6419 (*National Cancer [place]) or 1-380.334.6627 (American Lung Association) or " you can access the web based program at www.lungusa.org.    · [place] Tobacco Users Help Line:  (841) 919-4010       Toll Free: 1-122.127.4146  · Quit Tobacco Program [facility] (667)805-5594    DEPRESSION / SUICIDE RISK:  As you are discharged from this [facility], it is important to learn how to keep safe from harming yourself.    Recognize the warning signs:  · Abrupt changes in personality, positive or negative- including increase in energy   · Giving away possessions  · Change in eating patterns- significant weight changes-  positive or negative  · Change in sleeping patterns- unable to sleep or sleeping all the time   · Unwillingness or inability to communicate  · Depression  · Unusual sadness, discouragement and loneliness  · Talk of wanting to die  · Neglect of personal appearance   · Rebelliousness- reckless behavior  · Withdrawal from people/activities they love  · Confusion- inability to concentrate     If you or a loved one observes any of these behaviors or has concerns about self-harm, here's what you can do:  · Talk about it- your feelings and reasons for harming yourself  · Remove any means that you might use to hurt yourself (examples: pills, rope, extension cords, firearm)  · Get professional help from the community (Mental Health, Substance Abuse, psychological counseling)  · Do not be alone:Call your Safe Contact- someone whom you trust who will be there for you.  · Call your local CRISIS HOTLINE 293-7656 or 162-431-5786  · Call your local Children's Mobile Crisis Response Team [place] (442) 204-6797 or www.[place]  · Call the toll free National Suicide Prevention Hotlines   · National Suicide Prevention Lifeline 382-184-YBUZ (7224)  · National Hope Line Network 800-SUICIDE (829-1733)    DISCHARGE SURVEY:  Thank you for choosing [facility].  We hope we provided you with very good care.  You may be receiving a survey in the mail.  Please fill it  out.  Your opinion is valuable to us.    ADDITIONAL EDUCATIONAL MATERIALS GIVEN TO PATIENT:  Pelvic rest x 6 weeks   Rx's given: tylenol only for pain  Be seen in [place] for BP check in 1 week    My signature on this form indicates that:  1.  I have reviewed and understand the above information  2.  My questions regarding this information have been answered to my satisfaction.  3.  I have formulated a plan with my discharge nurse to obtain my prescribed medication for home.

## 2017-11-16 NOTE — PROGRESS NOTES
Assessment complete. VSS. Fundus firm, lochia scant. Pt denies pain at this time, will medicate with PRN pain meds per MAR. States voiding without difficulty. POC discussed. Encouraged to call with needs. Call light in place

## 2017-11-16 NOTE — PROGRESS NOTES
Discharge teaching reviewed with patient, all questions answered. Pt given all written information on self care, including follow-up instructions.  Prescriptions given to patient for labetalol.  Pt doing well with infant, and feels comfortable with her plan to pump at home with her hand pump.  Discharge papers signed, waiting for her ride to arrive at 1930.

## 2025-03-17 NOTE — DELIVERY NOTE
Call to pt.  Message conveyed.  Will forward to cristi   Pt with SROM and progressed rapidly delivering a liveborn female, Apgar 7/7 without episiotomy.  Delivery standby by Dr. Martinez.  No lacerations and spontaneous delivery of placenta.